# Patient Record
Sex: FEMALE | Race: OTHER | HISPANIC OR LATINO | Employment: OTHER | URBAN - METROPOLITAN AREA
[De-identification: names, ages, dates, MRNs, and addresses within clinical notes are randomized per-mention and may not be internally consistent; named-entity substitution may affect disease eponyms.]

---

## 2020-10-18 ENCOUNTER — HOSPITAL ENCOUNTER (INPATIENT)
Facility: HOSPITAL | Age: 85
LOS: 2 days | Discharge: HOME | DRG: 064 | End: 2020-10-20
Attending: EMERGENCY MEDICINE | Admitting: HOSPITALIST
Payer: MEDICARE

## 2020-10-18 DIAGNOSIS — I10 ESSENTIAL HYPERTENSION: ICD-10-CM

## 2020-10-18 DIAGNOSIS — G93.6 CYTOTOXIC CEREBRAL EDEMA: ICD-10-CM

## 2020-10-18 DIAGNOSIS — I63.312 THROMBOTIC STROKE INVOLVING LEFT MIDDLE CEREBRAL ARTERY: ICD-10-CM

## 2020-10-18 DIAGNOSIS — I63.9 STROKE: ICD-10-CM

## 2020-10-18 DIAGNOSIS — E78.2 MIXED HYPERLIPIDEMIA: ICD-10-CM

## 2020-10-18 PROBLEM — R29.810 FACIAL DROOP: Status: ACTIVE | Noted: 2020-10-18

## 2020-10-18 LAB
ALBUMIN SERPL BCP-MCNC: 4.3 G/DL (ref 3.5–5.2)
ALLENS TEST: NORMAL
ALLENS TEST: NORMAL
ALP SERPL-CCNC: 92 U/L (ref 55–135)
ALT SERPL W/O P-5'-P-CCNC: 13 U/L (ref 10–44)
ANION GAP SERPL CALC-SCNC: 11 MMOL/L (ref 8–16)
APTT BLDCRRT: 35.8 SEC (ref 21–32)
AST SERPL-CCNC: 22 U/L (ref 10–40)
BASOPHILS # BLD AUTO: 0.03 K/UL (ref 0–0.2)
BASOPHILS NFR BLD: 0.4 % (ref 0–1.9)
BILIRUB SERPL-MCNC: 1.2 MG/DL (ref 0.1–1)
BUN SERPL-MCNC: 10 MG/DL (ref 10–30)
CALCIUM SERPL-MCNC: 9.4 MG/DL (ref 8.7–10.5)
CHLORIDE SERPL-SCNC: 101 MMOL/L (ref 95–110)
CHOLEST SERPL-MCNC: 301 MG/DL (ref 120–199)
CHOLEST/HDLC SERPL: 6.5 {RATIO} (ref 2–5)
CO2 SERPL-SCNC: 23 MMOL/L (ref 23–29)
CREAT SERPL-MCNC: 0.7 MG/DL (ref 0.5–1.4)
CREAT SERPL-MCNC: 0.8 MG/DL (ref 0.5–1.4)
DELSYS: NORMAL
DELSYS: NORMAL
DIFFERENTIAL METHOD: ABNORMAL
EOSINOPHIL # BLD AUTO: 0.3 K/UL (ref 0–0.5)
EOSINOPHIL NFR BLD: 3.7 % (ref 0–8)
ERYTHROCYTE [DISTWIDTH] IN BLOOD BY AUTOMATED COUNT: 13.5 % (ref 11.5–14.5)
EST. GFR  (AFRICAN AMERICAN): >60 ML/MIN/1.73 M^2
EST. GFR  (NON AFRICAN AMERICAN): >60 ML/MIN/1.73 M^2
ESTIMATED AVG GLUCOSE: 114 MG/DL (ref 68–131)
GLUCOSE SERPL-MCNC: 108 MG/DL (ref 70–110)
HBA1C MFR BLD HPLC: 5.6 % (ref 4–5.6)
HCT VFR BLD AUTO: 40.9 % (ref 37–48.5)
HDLC SERPL-MCNC: 46 MG/DL (ref 40–75)
HDLC SERPL: 15.3 % (ref 20–50)
HGB BLD-MCNC: 13.4 G/DL (ref 12–16)
IMM GRANULOCYTES # BLD AUTO: 0.03 K/UL (ref 0–0.04)
IMM GRANULOCYTES NFR BLD AUTO: 0.4 % (ref 0–0.5)
INR PPP: 1 (ref 0.8–1.2)
LDLC SERPL CALC-MCNC: 225.2 MG/DL (ref 63–159)
LYMPHOCYTES # BLD AUTO: 2.5 K/UL (ref 1–4.8)
LYMPHOCYTES NFR BLD: 33 % (ref 18–48)
MCH RBC QN AUTO: 29.8 PG (ref 27–31)
MCHC RBC AUTO-ENTMCNC: 32.8 G/DL (ref 32–36)
MCV RBC AUTO: 91 FL (ref 82–98)
MONOCYTES # BLD AUTO: 0.6 K/UL (ref 0.3–1)
MONOCYTES NFR BLD: 7.5 % (ref 4–15)
NEUTROPHILS # BLD AUTO: 4.2 K/UL (ref 1.8–7.7)
NEUTROPHILS NFR BLD: 55 % (ref 38–73)
NONHDLC SERPL-MCNC: 255 MG/DL
NRBC BLD-RTO: 0 /100 WBC
PLATELET # BLD AUTO: 296 K/UL (ref 150–350)
PMV BLD AUTO: 8.4 FL (ref 9.2–12.9)
POC PTINR: 1 (ref 0.9–1.2)
POC PTWBT: 12.2 SEC (ref 9.7–14.3)
POTASSIUM SERPL-SCNC: 4.5 MMOL/L (ref 3.5–5.1)
PROT SERPL-MCNC: 8.2 G/DL (ref 6–8.4)
PROTHROMBIN TIME: 10.9 SEC (ref 9–12.5)
RBC # BLD AUTO: 4.5 M/UL (ref 4–5.4)
SAMPLE: NORMAL
SAMPLE: NORMAL
SARS-COV-2 RDRP RESP QL NAA+PROBE: NEGATIVE
SITE: NORMAL
SITE: NORMAL
SODIUM SERPL-SCNC: 135 MMOL/L (ref 136–145)
TRIGL SERPL-MCNC: 149 MG/DL (ref 30–150)
TROPONIN I SERPL DL<=0.01 NG/ML-MCNC: 0.01 NG/ML (ref 0–0.03)
TSH SERPL DL<=0.005 MIU/L-ACNC: 1.57 UIU/ML (ref 0.4–4)
WBC # BLD AUTO: 7.57 K/UL (ref 3.9–12.7)

## 2020-10-18 PROCEDURE — 85025 COMPLETE CBC W/AUTO DIFF WBC: CPT

## 2020-10-18 PROCEDURE — 80061 LIPID PANEL: CPT

## 2020-10-18 PROCEDURE — 85730 THROMBOPLASTIN TIME PARTIAL: CPT

## 2020-10-18 PROCEDURE — 85610 PROTHROMBIN TIME: CPT

## 2020-10-18 PROCEDURE — 84484 ASSAY OF TROPONIN QUANT: CPT

## 2020-10-18 PROCEDURE — G0425 INPT/ED TELECONSULT30: HCPCS | Mod: GT,,, | Performed by: PSYCHIATRY & NEUROLOGY

## 2020-10-18 PROCEDURE — 83036 HEMOGLOBIN GLYCOSYLATED A1C: CPT

## 2020-10-18 PROCEDURE — 94761 N-INVAS EAR/PLS OXIMETRY MLT: CPT

## 2020-10-18 PROCEDURE — 63600175 PHARM REV CODE 636 W HCPCS: Performed by: HOSPITALIST

## 2020-10-18 PROCEDURE — 99291 CRITICAL CARE FIRST HOUR: CPT | Mod: 25

## 2020-10-18 PROCEDURE — G0378 HOSPITAL OBSERVATION PER HR: HCPCS

## 2020-10-18 PROCEDURE — 93005 ELECTROCARDIOGRAM TRACING: CPT

## 2020-10-18 PROCEDURE — G0425 PR INPT TELEHEALTH CONSULT 30M: ICD-10-PCS | Mod: GT,,, | Performed by: PSYCHIATRY & NEUROLOGY

## 2020-10-18 PROCEDURE — 82565 ASSAY OF CREATININE: CPT

## 2020-10-18 PROCEDURE — 96372 THER/PROPH/DIAG INJ SC/IM: CPT

## 2020-10-18 PROCEDURE — U0002 COVID-19 LAB TEST NON-CDC: HCPCS

## 2020-10-18 PROCEDURE — 11000001 HC ACUTE MED/SURG PRIVATE ROOM

## 2020-10-18 PROCEDURE — 84443 ASSAY THYROID STIM HORMONE: CPT

## 2020-10-18 PROCEDURE — 80053 COMPREHEN METABOLIC PANEL: CPT

## 2020-10-18 RX ORDER — AMLODIPINE BESYLATE 10 MG/1
10 TABLET ORAL DAILY
Status: ON HOLD | COMMUNITY
End: 2020-10-20 | Stop reason: HOSPADM

## 2020-10-18 RX ORDER — LABETALOL HYDROCHLORIDE 5 MG/ML
10 INJECTION, SOLUTION INTRAVENOUS
Status: DISCONTINUED | OUTPATIENT
Start: 2020-10-18 | End: 2020-10-20 | Stop reason: HOSPADM

## 2020-10-18 RX ORDER — METOPROLOL SUCCINATE 100 MG/1
100 TABLET, EXTENDED RELEASE ORAL DAILY
Status: ON HOLD | COMMUNITY
End: 2020-10-20 | Stop reason: HOSPADM

## 2020-10-18 RX ORDER — NAPROXEN SODIUM 220 MG/1
81 TABLET, FILM COATED ORAL DAILY
Status: DISCONTINUED | OUTPATIENT
Start: 2020-10-19 | End: 2020-10-20 | Stop reason: HOSPADM

## 2020-10-18 RX ORDER — ALBUTEROL SULFATE 0.83 MG/ML
2.5 SOLUTION RESPIRATORY (INHALATION) EVERY 6 HOURS PRN
COMMUNITY

## 2020-10-18 RX ORDER — ONDANSETRON 2 MG/ML
4 INJECTION INTRAMUSCULAR; INTRAVENOUS EVERY 12 HOURS PRN
Status: DISCONTINUED | OUTPATIENT
Start: 2020-10-18 | End: 2020-10-20 | Stop reason: HOSPADM

## 2020-10-18 RX ORDER — ATORVASTATIN CALCIUM 40 MG/1
40 TABLET, FILM COATED ORAL DAILY
Status: DISCONTINUED | OUTPATIENT
Start: 2020-10-19 | End: 2020-10-19

## 2020-10-18 RX ORDER — SODIUM CHLORIDE 0.9 % (FLUSH) 0.9 %
10 SYRINGE (ML) INJECTION
Status: DISCONTINUED | OUTPATIENT
Start: 2020-10-18 | End: 2020-10-20 | Stop reason: HOSPADM

## 2020-10-18 RX ORDER — NAPROXEN SODIUM 220 MG/1
81 TABLET, FILM COATED ORAL DAILY
COMMUNITY

## 2020-10-18 RX ORDER — ENOXAPARIN SODIUM 100 MG/ML
40 INJECTION SUBCUTANEOUS EVERY 24 HOURS
Status: DISCONTINUED | OUTPATIENT
Start: 2020-10-18 | End: 2020-10-20 | Stop reason: HOSPADM

## 2020-10-18 RX ORDER — OMEPRAZOLE 20 MG/1
20 CAPSULE, DELAYED RELEASE ORAL DAILY
COMMUNITY

## 2020-10-18 RX ORDER — LOSARTAN POTASSIUM 25 MG/1
25 TABLET ORAL DAILY
Status: ON HOLD | COMMUNITY
End: 2020-10-20 | Stop reason: HOSPADM

## 2020-10-18 RX ADMIN — ENOXAPARIN SODIUM 40 MG: 40 INJECTION SUBCUTANEOUS at 10:10

## 2020-10-18 NOTE — ED PROVIDER NOTES
Encounter Date: 10/18/2020    SCRIBE #1 NOTE: I, Faby Guerin, am scribing for, and in the presence of,  Dr. Rodriguez. I have scribed the following portions of the note - Other sections scribed: HPI, ROS, PE, MDM.       History     Chief Complaint   Patient presents with    Facial Droop     grand daughter reports that at 1430 pt had trouble swallowing, drooling, and left sided facial droop.        Time seen by provider: 17:50 on 10/18/2020      Patient is a 89-year-old  female the past history of hypertension presents ED with complaint of right-sided facial droop that began at approximately 2:30 p.m. today.  Last known normal at 2:30 p.m..  The granddaughter reports right-sided facial droop with dysarthria and difficulty tolerating secretions at maximal onset of approximately 2:30 p.m..  Granddaughter denies any additional symptoms or complaints from the patient such as weakness, numbness, vision change or headache.  Per granddaughter, patient's facial droop has improved slightly on arrival to the emergency department.  The patient denies any current complaints on my questioning.  Patient denies any history of ischemic or hemorrhagic stroke.    Right-sided facial droop appreciated by nursing staff on patient arrival to the emergency department.  Stroke Code immediately called and patient rush rolled to the CT scanner for CT head  without IV contrast.    The history is provided by a relative and the patient.     Review of patient's allergies indicates:  No Known Allergies  Past Medical History:   Diagnosis Date    Hypertension      History reviewed. No pertinent surgical history.  History reviewed. No pertinent family history.  Social History     Tobacco Use    Smoking status: Never Smoker    Smokeless tobacco: Never Used   Substance Use Topics    Alcohol use: Not on file    Drug use: Not on file     Review of Systems   Constitutional: Negative for chills and fever.   HENT: Negative for sore throat.     Eyes: Negative for visual disturbance.   Respiratory: Negative for shortness of breath.    Cardiovascular: Negative for chest pain.   Gastrointestinal: Negative for abdominal pain, nausea and vomiting.   Genitourinary: Negative for difficulty urinating, frequency and urgency.   Musculoskeletal: Negative for back pain.   Skin: Negative for rash and wound.   Neurological: Positive for facial asymmetry (R sided droop). Negative for syncope, speech difficulty, weakness and headaches.   Psychiatric/Behavioral: Negative for agitation, behavioral problems and confusion.       Physical Exam     Initial Vitals   BP Pulse Resp Temp SpO2   10/18/20 1747 10/18/20 1747 10/18/20 1747 10/18/20 1851 10/18/20 1748   133/71 86 (!) 25 98.4 °F (36.9 °C) 95 %      MAP       --                Physical Exam    Nursing note and vitals reviewed.  Constitutional: She appears well-developed and well-nourished. She is not diaphoretic. No distress.   HENT:   Head: Normocephalic and atraumatic.   Right Ear: Tympanic membrane normal.   Left Ear: Tympanic membrane normal.   Mouth/Throat: Oropharynx is clear and moist.   Eyes: Conjunctivae and EOM are normal. Pupils are equal, round, and reactive to light.   Neck: Normal range of motion. Neck supple.   Cardiovascular: Normal rate, regular rhythm and normal heart sounds. Exam reveals no gallop and no friction rub.    No murmur heard.  Pulmonary/Chest: Breath sounds normal. She has no wheezes. She has no rhonchi. She has no rales.   Abdominal: Soft. Bowel sounds are normal. There is no abdominal tenderness. There is no rebound and no guarding.   Musculoskeletal: Normal range of motion. No tenderness or edema.   Lymphadenopathy:     She has no cervical adenopathy.   Neurological: She is alert and oriented to person, place, and time. She has normal strength.   Mild R sided facial droop   No pronator drift  No aphasia  Bilateral upper and bilateral lower strength 5/5  Sensation grossly intact  No  aphasia  No dysarthria     Skin: Skin is warm and dry. Capillary refill takes less than 2 seconds. No rash noted.         ED Course   Critical Care    Date/Time: 10/18/2020 6:53 PM  Performed by: Jose Rodriguez MD  Authorized by: Jose Rodirguez MD   Direct patient critical care time: 10 minutes  Additional history critical care time: 5 minutes  Ordering / reviewing critical care time: 5 minutes  Documentation critical care time: 10 minutes  Consulting other physicians critical care time: 10 minutes  Consult with family critical care time: 10 minutes  Other critical care time: 10 minutes  Total critical care time (exclusive of procedural time) : 60 minutes        Labs Reviewed   CBC W/ AUTO DIFFERENTIAL - Abnormal; Notable for the following components:       Result Value    MPV 8.4 (*)     All other components within normal limits   COMPREHENSIVE METABOLIC PANEL - Abnormal; Notable for the following components:    Sodium 135 (*)     Total Bilirubin 1.2 (*)     All other components within normal limits   LIPID PANEL - Abnormal; Notable for the following components:    Cholesterol 301 (*)     LDL Cholesterol 225.2 (*)     Hdl/Cholesterol Ratio 15.3 (*)     Total Cholesterol/HDL Ratio 6.5 (*)     All other components within normal limits   APTT - Abnormal; Notable for the following components:    aPTT 35.8 (*)     All other components within normal limits   PROTIME-INR   TSH   TROPONIN I   SARS-COV-2 RNA AMPLIFICATION, QUAL   HEMOGLOBIN A1C   HEMOGLOBIN A1C   POCT GLUCOSE, HAND-HELD DEVICE   ISTAT PROCEDURE   ISTAT CREATININE     EKG Readings: (Independently Interpreted)   Sinus rhythm with first degree AV block at 81 bpm. No ST elevations or depressions. No STEMI.        Imaging Results          CT Head Without Contrast (Final result)  Result time 10/18/20 17:52:48    Final result by Cezar Montemayor MD (10/18/20 17:52:48)                 Impression:      Moderate involutional and chronic small vessel  changes not inappropriate for age.    No evidence of acute hemorrhage, mass or infarction.    Consider MRI if clinically warranted.      Electronically signed by: Cezar De Oliveiravestri  Date:    10/18/2020  Time:    17:52             Narrative:    EXAMINATION:  CT HEAD WITHOUT CONTRAST    CLINICAL HISTORY:  Neuro deficit, acute, stroke suspected;    TECHNIQUE:  Low dose axial images were obtained through the head.  Coronal and sagittal reformations were also performed. Contrast was not administered.    COMPARISON:  None.    FINDINGS:  There is moderate involutional change manifested by prominent cortical sulcal markings, basilar cisterns and ventricular system.  Low density throughout the deep and subcortical white matter of both cerebral hemispheres is present.  There is no focal loss of gray-white matter junction differentiation, mass or mass effect.  The ventricular system and basilar cisterns appear appropriate size and configuration.  The calvarium is intact.  The sinuses and mastoids are clear.  The middle ears are unremarkable.  Atherosclerotic calcifications of the carotid siphons are noted.  The orbits and orbital contents appear unremarkable.                                 Medical Decision Making:   History:   Old Medical Records: I decided to obtain old medical records.  Clinical Tests:   Lab Tests: Ordered and Reviewed  Radiological Study: Reviewed and Ordered  Medical Tests: Ordered and Reviewed  ED Management:  - CT head WO negative for acute intracranial abnormality per final radiology read  - labs unremarkable   - no further deficits or decompensation of patient while in emergency department  - discussed need for admission with patient and patient's granddaughter; all questions answered   - will admit to [facility] Medicine service for further evaluation and management; recommend MRI brain   - case discussed with Dr. Nugent ([facility]) who will accept the patient to his service         Additional MDM:     NIH Stroke Scale:   Level of consciousness = 0 - alert  LOC questions = 0 - answers both correctly  LOC commands = 0 - performs both correctly  Best gaze = 0 - normal  Visual = 0 - no visual loss  Motor left arm =  0 - no drift  Motor right arm =  0 - no drift  Motor left leg = 0 - no drift  Motor right leg =  0 - no drift  Limb ataxia = 0 - absent  Sensory = 0 - normal  Best language = 0 - no aphasia  Dysarthria = 0 - normal articulation  Extinction and inattention = 0 - no neglect                ED Course as of Oct 18 1858   Sun Oct 18, 2020   1816 Spoke with teleneurology, Dr. Roberts, who advised admission and MRI. She did not recommend stat MRI due to advanced age and presentation.     [BJ]      ED Course User Index  [BJ] Faby Guerin            Clinical Impression:     ICD-10-CM ICD-9-CM   1. Stroke  I63.9 434.91                          ED Disposition Condition    Observation            I, Jose Rodriguez,  personally performed the services described in this documentation. All medical record entries made by the scribe were at my direction and in my presence.  I have reviewed the chart and agree that the record reflects my personal performance and is accurate and complete. Jose Rodriguez M.D. 6:58 PM10/18/2020               Jose Rodriguez MD  10/18/20 1859

## 2020-10-18 NOTE — HPI
89 y/o female with HTN who presented from home to ER after her daughter noticed difficulty swallowing, drooling, and right facial droop.  She was last known normal around 2:30-3:00.  She has never had these symptoms before.  Symptoms are acute and have not improved.  No associated speech changes or visual deficits.  No obvious weakness or numbness.

## 2020-10-18 NOTE — SUBJECTIVE & OBJECTIVE
Neurologic Chief Complaint: Facial droop, dysphagia    Subjective:     Interval History: Patient is seen for follow-up neurological assessment and treatment recommendations:     No acute issues or events overnight.  Continues with neurological deficits.  MRI with small area of diffusion restriction in deep MCA territory.      HPI, Past Medical, Family, and Social History remains the same as documented in the initial encounter.      Review of Systems   Unable to perform ROS: Age   Constitutional: Negative for chills and fever.   HENT: Positive for drooling and trouble swallowing. Negative for voice change.    Eyes: Negative for pain and visual disturbance.   Respiratory: Negative for cough and shortness of breath.    Cardiovascular: Negative for chest pain and palpitations.   Gastrointestinal: Negative for abdominal pain, nausea and vomiting.   Genitourinary: Negative for difficulty urinating.   Musculoskeletal: Negative for arthralgias.   Skin: Negative for rash.   Allergic/Immunologic: Negative for immunocompromised state.   Neurological: Positive for facial asymmetry and speech difficulty. Negative for dizziness, weakness and headaches.   Psychiatric/Behavioral: Negative for agitation.     Objective:   Vitals:  133/71        Physical Exam  Constitutional:       General: She is not in acute distress.     Appearance: Normal appearance.   HENT:      Head: Normocephalic and atraumatic.   Pulmonary:      Effort: Pulmonary effort is normal. No respiratory distress.   Neurological:      Mental Status: She is alert.   Psychiatric:         Mood and Affect: Mood normal.     Neurological Exam  LOC: alert; able to follow complex commands  Attention Span: Good   Language: No aphasia  Articulation: No dysarthria  Orientation: Person, Place, Time   Visual Fields: Full  EOM (CN III, IV, VI): Full/intact  Pupils (CN II, III): PERRL  Facial Sensation (CN V): Normal  Facial Movement (CN VII): Lower facial weakness on the  Right  Motor: Arm left  Full anitgravity  Leg left  Full antigravity  Arm right  Full antigravity  Leg right  Partial antigravity; drifts down some   Cerebellar Dysmetria noted to right hand; normal finger to nose on left.  Normal heel shin bilaterally  Sensation: decreased sensation right arm    Diagnostic Results     Brain Imaging   10/18/2020 CT head  No early infarct signs.  No hemorrhage. Periventricular hypodensities suggestive of chronic white matter disease  .  10/19/2020 MRI  Small area of diffusion restriction within the left corona radiata.  No hemorrhage.  Periventricular FLAIR changes.    Cardiac Imaging   10/19/2020 TTE  · The left ventricle is normal in size. The estimated ejection fraction is 65%.  · Grade I diastolic dysfunction.  · Mild mitral regurgitation.  · Mild to moderate tricuspid regurgitation.  · Mild aortic regurgitation.  · There is no evidence of intracardiac shunting.  · Normal central venous pressure (3 mmHg).  · The estimated PA systolic pressure is 50 mmHg.

## 2020-10-18 NOTE — CONSULTS
[facility]  Vascular Neurology  Comprehensive Stroke Center  Tele-Consultation Note      Consults    Consulting Provider: CHRISTINA CALDERON  Current Providers  No providers found    Patient Location:  [facility] EMERGENCY DEPARTMENT Emergency Department  Spoke hospital nurse at bedside with patient assisting consultant.     Patient information was obtained from relative(s).         Assessment/Plan:     STROKE DOCUMENTATION     Acute Stroke Times:   Acute Stroke Times   Last Known Normal Date: 10/18/20  Last Known Normal Time: 1430  Symptom Onset Date: 10/18/20  Symptom Onset Time: 1430  Stroke Team Called Date: 10/18/20  Stroke Team Called Time: 1737  Stroke Team Arrival Date: 10/18/20  Stroke Team Arrival Time: 1745  CT Interpretation Time: 1745    NIH Scale:  1a. Level of Consciousness: 0-->Alert, keenly responsive  1b. LOC Questions: 0-->Answers both questions correctly  1c. LOC Commands: 0-->Performs both tasks correctly  2. Best Gaze: 0-->Normal  3. Visual: 0-->No visual loss  4. Facial Palsy: 2-->Partial paralysis (total or near-total paralysis of lower face)  5a. Motor Arm, Left: 0-->No drift, limb holds 90 (or 45) degrees for full 10 secs  5b. Motor Arm, Right: 0-->No drift, limb holds 90 (or 45) degrees for full 10 secs  6a. Motor Leg, Left: 0-->No drift, leg holds 30 degree position for full 5 secs  6b. Motor Leg, Right: 0-->No drift, leg holds 30 degree position for full 5 secs  7. Limb Ataxia: 0-->Absent  8. Sensory: 0-->Normal, no sensory loss  9. Best Language: 0-->No aphasia, normal  10. Dysarthria: 1-->Mild-to-moderate dysarthria, patient slurs at least some words and, at worst, can be understood with some difficulty  11. Extinction and Inattention (formerly Neglect): 0-->No abnormality  Total (NIH Stroke Scale): 3     Modified Nataliya    Carlos A Coma Scale:    ABCD2 Score:    [id number] Score:   HAS -BLED Score:   ICH Score:   Hunt & Cho Classification:       Diagnoses:    Facial droop  89 y/o woman presenting with isolated facial droop, which appears upper motor neuron in localization (symmetric and full activation of upper face).  Will defer tPA as symptoms not disabling.  Recommend admission for MRI brain, stroke workup if applicable.        Blood pressure 133/71, pulse 81, resp. rate 18, SpO2 97 %, not currently breastfeeding.  Alteplase Eligible?: Yes  Alteplase Recommendation: Alteplase not recommended due to Mild Non-Disabling Symptoms  Possible Interventional Revascularization Candidate? No; No significant neurological deficit    Disposition Recommendation: admit to inpatient    Subjective:     History of Present Illness:  89 y/o woman with HTN who presents with facial droop and slurred speech.  Per granddaughter, patient started to have slurred speech and drooling around 3 pm when she tried to drink a glass of water.  No prior history of stroke.    Note - technical difficulties limited full Telestroke assessment (no audio on spoke hospital's end).      Woke up with symptoms?: no    Recent bleeding noted: no  Does the patient take any Blood Thinners? no  Medications: Antiplatelets:  aspirin      Past Medical History: hypertension    Past Surgical History: no relevant surgical history    Family History: no relevant history    Social History: no smoking, no drinking, no drugs    Allergies: No Known Allergies No relevant allergies    Review of Systems   Unable to perform ROS: Age     Objective:   Vitals:  133/71    CT READ: Yes  No hemmorhage. No mass effect. No early infarct signs.     Physical Exam  Constitutional:       General: She is not in acute distress.  HENT:      Head: Normocephalic and atraumatic.   Eyes:      Pupils: Pupils are equal, round, and reactive to light.   Pulmonary:      Effort: Pulmonary effort is normal.   Neurological:      Comments: MS: A&XO3, speech fluent, follows commands, no neglect  CN: PERRL, EOMI, sensation intact, R UMN Facial droop, mild  dysarthria  Motor: no arm or leg drift  Sens: intact to LT  Coord: no ataxia on finger to nose                   Recommended the emergency room physician to have a brief discussion with the patient and/or family if available regarding the risks and benefits of treatment, and to briefly document the occurrence of that discussion in his clinical encounter note.     The attending portion of this evaluation, treatment, and documentation was performed per Sally Roberts MD via audiovisual.    Billing code:  (non-intervention mild to moderate stroke, TIA, some mimics)    · This patient has a critical neurological condition/illness, with some potential for high morbidity and mortality.  · There is a moderate probability for acute neurological change leading to clinical and possibly life-threatening deterioration requiring highest level of physician preparedness for urgent intervention.  · Care was coordinated with other physicians involved in the patient's care.  · Radiologic studies and laboratory data were reviewed and interpreted, and plan of care was re-assessed based on the results.  · Diagnosis, treatment options and prognosis may have been discussed with the patient and/or family members or caregiver.      In your opinion, this was a: Tier 1 Van Negative    Consult End Time: 5:59 PM     Sally Roberts MD  Comprehensive Stroke Center  Vascular Neurology   [facility]

## 2020-10-18 NOTE — ED TRIAGE NOTES
"Pt at baseline walks, usually feed self, yesterday granddaughter, gave pt water and noticed " she chocked a little" then began coughing,  Then went to lay down and then woke up normal, granddaughter states when she went to give food again this afternoon she again chocked on water, then began drooling with facial droop, with slurred speech   "

## 2020-10-19 PROBLEM — R53.81 DEBILITATED: Status: ACTIVE | Noted: 2020-10-19

## 2020-10-19 PROBLEM — E78.5 HYPERLIPEMIA: Status: ACTIVE | Noted: 2020-10-19

## 2020-10-19 PROBLEM — I10 ESSENTIAL HYPERTENSION: Status: ACTIVE | Noted: 2020-10-19

## 2020-10-19 PROBLEM — G93.6 CYTOTOXIC CEREBRAL EDEMA: Status: ACTIVE | Noted: 2020-10-19

## 2020-10-19 PROBLEM — I63.312 THROMBOTIC STROKE INVOLVING LEFT MIDDLE CEREBRAL ARTERY: Status: ACTIVE | Noted: 2020-10-19

## 2020-10-19 LAB
ALBUMIN SERPL BCP-MCNC: 3.6 G/DL (ref 3.5–5.2)
ALP SERPL-CCNC: 75 U/L (ref 55–135)
ALT SERPL W/O P-5'-P-CCNC: 11 U/L (ref 10–44)
ANION GAP SERPL CALC-SCNC: 9 MMOL/L (ref 8–16)
AORTIC ROOT ANNULUS: 2.79 CM
AORTIC VALVE CUSP SEPERATION: 1.52 CM
APTT BLDCRRT: 39.7 SEC (ref 21–32)
AST SERPL-CCNC: 18 U/L (ref 10–40)
AV INDEX (PROSTH): 0.79
AV MEAN GRADIENT: 4 MMHG
AV PEAK GRADIENT: 9 MMHG
AV VALVE AREA: 2.54 CM2
AV VELOCITY RATIO: 0.79
BASOPHILS # BLD AUTO: 0.05 K/UL (ref 0–0.2)
BASOPHILS NFR BLD: 0.8 % (ref 0–1.9)
BILIRUB SERPL-MCNC: 1.1 MG/DL (ref 0.1–1)
BSA FOR ECHO PROCEDURE: 1.53 M2
BUN SERPL-MCNC: 9 MG/DL (ref 10–30)
CALCIUM SERPL-MCNC: 8.7 MG/DL (ref 8.7–10.5)
CHLORIDE SERPL-SCNC: 103 MMOL/L (ref 95–110)
CK MB SERPL-MCNC: 1.4 NG/ML (ref 0.1–6.5)
CK MB SERPL-RTO: 2 % (ref 0–5)
CK SERPL-CCNC: 69 U/L (ref 20–180)
CO2 SERPL-SCNC: 25 MMOL/L (ref 23–29)
CREAT SERPL-MCNC: 0.7 MG/DL (ref 0.5–1.4)
CV ECHO LV RWT: 0.44 CM
DIFFERENTIAL METHOD: ABNORMAL
DOP CALC AO PEAK VEL: 1.46 M/S
DOP CALC AO VTI: 26.57 CM
DOP CALC LVOT AREA: 3.2 CM2
DOP CALC LVOT DIAMETER: 2.02 CM
DOP CALC LVOT PEAK VEL: 1.16 M/S
DOP CALC LVOT STROKE VOLUME: 67.59 CM3
DOP CALCLVOT PEAK VEL VTI: 21.1 CM
E WAVE DECELERATION TIME: 174.23 MSEC
E/A RATIO: 0.39
E/E' RATIO: 4.67 M/S
ECHO LV POSTERIOR WALL: 0.79 CM (ref 0.6–1.1)
EOSINOPHIL # BLD AUTO: 0.3 K/UL (ref 0–0.5)
EOSINOPHIL NFR BLD: 4.7 % (ref 0–8)
ERYTHROCYTE [DISTWIDTH] IN BLOOD BY AUTOMATED COUNT: 13.4 % (ref 11.5–14.5)
EST. GFR  (AFRICAN AMERICAN): >60 ML/MIN/1.73 M^2
EST. GFR  (NON AFRICAN AMERICAN): >60 ML/MIN/1.73 M^2
FRACTIONAL SHORTENING: 22 % (ref 28–44)
GLUCOSE SERPL-MCNC: 100 MG/DL (ref 70–110)
HCT VFR BLD AUTO: 36.2 % (ref 37–48.5)
HGB BLD-MCNC: 12 G/DL (ref 12–16)
IMM GRANULOCYTES # BLD AUTO: 0.01 K/UL (ref 0–0.04)
IMM GRANULOCYTES NFR BLD AUTO: 0.2 % (ref 0–0.5)
INR PPP: 1.1 (ref 0.8–1.2)
INTERVENTRICULAR SEPTUM: 0.98 CM (ref 0.6–1.1)
IVRT: 72.31 MSEC
LA MAJOR: 4.7 CM
LA MINOR: 4.8 CM
LA WIDTH: 2.69 CM
LEFT ATRIUM SIZE: 2.76 CM
LEFT ATRIUM VOLUME INDEX: 19.2 ML/M2
LEFT ATRIUM VOLUME: 29.97 CM3
LEFT INTERNAL DIMENSION IN SYSTOLE: 2.79 CM (ref 2.1–4)
LEFT VENTRICLE DIASTOLIC VOLUME INDEX: 34.05 ML/M2
LEFT VENTRICLE DIASTOLIC VOLUME: 53.03 ML
LEFT VENTRICLE MASS INDEX: 57 G/M2
LEFT VENTRICLE SYSTOLIC VOLUME INDEX: 18.8 ML/M2
LEFT VENTRICLE SYSTOLIC VOLUME: 29.32 ML
LEFT VENTRICULAR INTERNAL DIMENSION IN DIASTOLE: 3.56 CM (ref 3.5–6)
LEFT VENTRICULAR MASS: 89.04 G
LV LATERAL E/E' RATIO: 4.38 M/S
LV SEPTAL E/E' RATIO: 5 M/S
LYMPHOCYTES # BLD AUTO: 2.4 K/UL (ref 1–4.8)
LYMPHOCYTES NFR BLD: 35.5 % (ref 18–48)
MAGNESIUM SERPL-MCNC: 2.3 MG/DL (ref 1.6–2.6)
MCH RBC QN AUTO: 29.9 PG (ref 27–31)
MCHC RBC AUTO-ENTMCNC: 33.1 G/DL (ref 32–36)
MCV RBC AUTO: 90 FL (ref 82–98)
MONOCYTES # BLD AUTO: 0.5 K/UL (ref 0.3–1)
MONOCYTES NFR BLD: 7.4 % (ref 4–15)
MV PEAK A VEL: 0.9 M/S
MV PEAK E VEL: 0.35 M/S
MV STENOSIS PRESSURE HALF TIME: 50.53 MS
MV VALVE AREA P 1/2 METHOD: 4.35 CM2
NEUTROPHILS # BLD AUTO: 3.4 K/UL (ref 1.8–7.7)
NEUTROPHILS NFR BLD: 51.4 % (ref 38–73)
NRBC BLD-RTO: 0 /100 WBC
PHOSPHATE SERPL-MCNC: 3.4 MG/DL (ref 2.7–4.5)
PISA MRMAX VEL: 0.06 M/S
PISA TR MAX VEL: 3.42 M/S
PLATELET # BLD AUTO: 266 K/UL (ref 150–350)
PMV BLD AUTO: 8.2 FL (ref 9.2–12.9)
POTASSIUM SERPL-SCNC: 4.2 MMOL/L (ref 3.5–5.1)
PROT SERPL-MCNC: 6.7 G/DL (ref 6–8.4)
PROTHROMBIN TIME: 11.2 SEC (ref 9–12.5)
PULM VEIN S/D RATIO: 1.67
PV PEAK D VEL: 0.36 M/S
PV PEAK S VEL: 0.6 M/S
PV PEAK VELOCITY: 0.84 CM/S
RA MAJOR: 4.9 CM
RA PRESSURE: 3 MMHG
RA WIDTH: 3.88 CM
RBC # BLD AUTO: 4.02 M/UL (ref 4–5.4)
RIGHT VENTRICULAR END-DIASTOLIC DIMENSION: 2.64 CM
SODIUM SERPL-SCNC: 137 MMOL/L (ref 136–145)
TDI LATERAL: 0.08 M/S
TDI SEPTAL: 0.07 M/S
TDI: 0.08 M/S
TR MAX PG: 47 MMHG
TROPONIN I SERPL DL<=0.01 NG/ML-MCNC: 0.01 NG/ML (ref 0–0.03)
TV REST PULMONARY ARTERY PRESSURE: 50 MMHG
WBC # BLD AUTO: 6.62 K/UL (ref 3.9–12.7)

## 2020-10-19 PROCEDURE — 97165 OT EVAL LOW COMPLEX 30 MIN: CPT

## 2020-10-19 PROCEDURE — 36415 COLL VENOUS BLD VENIPUNCTURE: CPT

## 2020-10-19 PROCEDURE — 25000003 PHARM REV CODE 250: Performed by: NURSE PRACTITIONER

## 2020-10-19 PROCEDURE — 84484 ASSAY OF TROPONIN QUANT: CPT

## 2020-10-19 PROCEDURE — 96374 THER/PROPH/DIAG INJ IV PUSH: CPT

## 2020-10-19 PROCEDURE — 92610 EVALUATE SWALLOWING FUNCTION: CPT

## 2020-10-19 PROCEDURE — 82550 ASSAY OF CK (CPK): CPT

## 2020-10-19 PROCEDURE — 97161 PT EVAL LOW COMPLEX 20 MIN: CPT

## 2020-10-19 PROCEDURE — G0408 PR TELHEALTH INPT CONSULT 35MIN: ICD-10-PCS | Mod: GT,,, | Performed by: NURSE PRACTITIONER

## 2020-10-19 PROCEDURE — 85730 THROMBOPLASTIN TIME PARTIAL: CPT

## 2020-10-19 PROCEDURE — 83735 ASSAY OF MAGNESIUM: CPT

## 2020-10-19 PROCEDURE — 63600175 PHARM REV CODE 636 W HCPCS: Performed by: HOSPITALIST

## 2020-10-19 PROCEDURE — 85610 PROTHROMBIN TIME: CPT

## 2020-10-19 PROCEDURE — 84100 ASSAY OF PHOSPHORUS: CPT

## 2020-10-19 PROCEDURE — 80053 COMPREHEN METABOLIC PANEL: CPT

## 2020-10-19 PROCEDURE — 11000001 HC ACUTE MED/SURG PRIVATE ROOM

## 2020-10-19 PROCEDURE — 94761 N-INVAS EAR/PLS OXIMETRY MLT: CPT

## 2020-10-19 PROCEDURE — G0408 INPT/TELE FOLLOW UP 35: HCPCS | Mod: GT,,, | Performed by: NURSE PRACTITIONER

## 2020-10-19 PROCEDURE — 97802 MEDICAL NUTRITION INDIV IN: CPT

## 2020-10-19 PROCEDURE — 82553 CREATINE MB FRACTION: CPT

## 2020-10-19 PROCEDURE — 85025 COMPLETE CBC W/AUTO DIFF WBC: CPT

## 2020-10-19 PROCEDURE — 25000003 PHARM REV CODE 250: Performed by: HOSPITALIST

## 2020-10-19 PROCEDURE — 96372 THER/PROPH/DIAG INJ SC/IM: CPT | Mod: 59

## 2020-10-19 RX ORDER — PANTOPRAZOLE SODIUM 40 MG/1
40 TABLET, DELAYED RELEASE ORAL DAILY
Status: DISCONTINUED | OUTPATIENT
Start: 2020-10-19 | End: 2020-10-20 | Stop reason: HOSPADM

## 2020-10-19 RX ORDER — ATORVASTATIN CALCIUM 40 MG/1
80 TABLET, FILM COATED ORAL DAILY
Status: DISCONTINUED | OUTPATIENT
Start: 2020-10-20 | End: 2020-10-20 | Stop reason: HOSPADM

## 2020-10-19 RX ORDER — CLOPIDOGREL BISULFATE 75 MG/1
75 TABLET ORAL DAILY
Status: DISCONTINUED | OUTPATIENT
Start: 2020-10-20 | End: 2020-10-20 | Stop reason: HOSPADM

## 2020-10-19 RX ORDER — CLOPIDOGREL BISULFATE 75 MG/1
300 TABLET ORAL ONCE
Status: COMPLETED | OUTPATIENT
Start: 2020-10-19 | End: 2020-10-19

## 2020-10-19 RX ADMIN — LORAZEPAM 1 MG: 2 INJECTION INTRAMUSCULAR; INTRAVENOUS at 01:10

## 2020-10-19 RX ADMIN — CLOPIDOGREL 300 MG: 75 TABLET, FILM COATED ORAL at 06:10

## 2020-10-19 RX ADMIN — ENOXAPARIN SODIUM 40 MG: 40 INJECTION SUBCUTANEOUS at 04:10

## 2020-10-19 RX ADMIN — ATORVASTATIN CALCIUM 40 MG: 40 TABLET, FILM COATED ORAL at 09:10

## 2020-10-19 RX ADMIN — ASPIRIN 81 MG 81 MG: 81 TABLET ORAL at 09:10

## 2020-10-19 RX ADMIN — PANTOPRAZOLE SODIUM 40 MG: 40 TABLET, DELAYED RELEASE ORAL at 09:10

## 2020-10-19 NOTE — SUBJECTIVE & OBJECTIVE
Past Medical History:   Diagnosis Date    Hypertension        History reviewed. No pertinent surgical history.    Review of patient's allergies indicates:  No Known Allergies    No current facility-administered medications on file prior to encounter.      Current Outpatient Medications on File Prior to Encounter   Medication Sig    albuterol (PROVENTIL) 2.5 mg /3 mL (0.083 %) nebulizer solution Take 2.5 mg by nebulization every 6 (six) hours as needed for Wheezing. Rescue    amLODIPine (NORVASC) 10 MG tablet Take 10 mg by mouth once daily.    aspirin 81 MG Chew Take 81 mg by mouth once daily.    losartan (COZAAR) 25 MG tablet Take 25 mg by mouth once daily.    metoprolol succinate (TOPROL-XL) 100 MG 24 hr tablet Take 100 mg by mouth once daily.    omeprazole (PRILOSEC) 20 MG capsule Take 20 mg by mouth once daily.     Family History     None        Tobacco Use    Smoking status: Never Smoker    Smokeless tobacco: Never Used   Substance and Sexual Activity    Alcohol use: Not on file    Drug use: Not on file    Sexual activity: Not on file     Review of Systems   HENT: Positive for hearing loss.      Objective:     Vital Signs (Most Recent):  Temp: 96.2 °F (35.7 °C) (10/19/20 0754)  Pulse: 81 (10/19/20 0754)  Resp: 17 (10/19/20 0754)  BP: (!) 143/69 (10/19/20 0754)  SpO2: 95 % (10/19/20 0754) Vital Signs (24h Range):  Temp:  [96.2 °F (35.7 °C)-98.4 °F (36.9 °C)] 96.2 °F (35.7 °C)  Pulse:  [62-89] 81  Resp:  [16-37] 17  SpO2:  [94 %-98 %] 95 %  BP: (110-143)/(59-75) 143/69     Weight: 50.6 kg (111 lb 8.8 oz)  Body mass index is 18.01 kg/m².    Physical Exam        Significant Labs: {Results:48908}    Significant Imaging: {Imaging Review:76299}

## 2020-10-19 NOTE — ASSESSMENT & PLAN NOTE
-140  Taking amlodipine 10 mg daily, metoprolol 100 mg daily and losartan 25 mg daily  Avoid aggressive BP management for now   Allow permissive HTN

## 2020-10-19 NOTE — CONSULTS
"  [facility]  Adult Nutrition  Consult Note    SUMMARY     Recommendations    Recommendation:   1. Add low Na restrictions to diet.   2. Encourage intake at meals as tolerated.   3. Monitor need for supplements.   4. RD to follow to monitor po intake    Goals:   Pt will tolerate diet with at least 50% intake at meals by RD follow up  Nutrition Goal Status: new  Communication of RD Recs: reviewed with RN(Carleen)    Reason for Assessment  Reason For Assessment: consult(stroke pathway)  Diagnosis: stroke/CVA  Relevant Medical History: HTN  General Information Comments: Pt started on Mech Soft diet per SLP recs. Pt was off unit to MRI at visit-unable to assess NFPE.  Nutrition Discharge Planning: d/c diet per SLP recs    Nutrition Risk Screen  Nutrition Risk Screen: no indicators present    Nutrition/Diet History  Food Preferences: no Faith or cultural food prefs identified  Spiritual, Cultural Beliefs, [employer] Practices, Values that Affect Care: no  Factors Affecting Nutritional Intake: difficulty/impaired swallowing    Anthropometrics  Temp: 96.7 °F (35.9 °C)  Height Method: Stated  Height: 5' 6" (167.6 cm)  Height (inches): 66 in  Weight Method: Bed Scale  Weight: 50.3 kg (110 lb 14.3 oz)  Weight (lb): 110.89 lb  Ideal Body Weight (IBW), Female: 130 lb  % Ideal Body Weight, Female (lb): 85.3 %  BMI (Calculated): 17.9  BMI Grade: 17 - 18.4 protein-energy malnutrition grade I     Lab/Procedures/Meds  Pertinent Labs Reviewed: reviewed  Pertinent Labs Comments: BUN 9L  Pertinent Medications Reviewed: reviewed  Pertinent Medications Comments: aspirin, pantoprazole    Estimated/Assessed Needs  Weight Used For Calorie Calculations: 50.3 kg (110 lb 14.3 oz)  Energy Calorie Requirements (kcal): 1509 (30 kcal/kg)  Energy Need Method: Kcal/kg  Protein Requirements: 60g (1.2g/kg)  Weight Used For Protein Calculations: 50.3 kg (110 lb 14.3 oz)  Estimated Fluid Requirement Method: RDA Method  RDA Method " (mL): 1509     Nutrition Prescription Ordered  Current Diet Order: [facility] Soft    Evaluation of Received Nutrient/Fluid Intake  I/O: N/A  Energy Calories Required: not meeting needs  Protein Required: not meeting needs  Fluid Required: not meeting needs  Comments: LBM 10/18  % Intake of Estimated Energy Needs: Other: diet just started  % Meal Intake: Other: diet just started    Nutrition Risk  Level of Risk/Frequency of Follow-up: (2xweekly)     Assessment and Plan  * Stroke  Contributing Nutrition Diagnosis  Difficulty swallowing    Related to (etiology):   stroke    Signs and Symptoms (as evidenced by):   [facility] Soft diet    Interventions:  Collaboration with other providers    Nutrition Diagnosis Status:   New         Monitor and Evaluation  Food and Nutrient Intake: food and beverage intake  Food and Nutrient Adminstration: diet order  Physical Activity and Function: nutrition-related ADLs and IADLs  Anthropometric Measurements: weight  Biochemical Data, Medical Tests and Procedures: electrolyte and renal panel  Nutrition-Focused Physical Findings: overall appearance     Malnutrition Assessment  Unable to assess NFPE-pt off unit at visit       Nutrition Follow-Up    RD Follow-up?: Yes

## 2020-10-19 NOTE — ASSESSMENT & PLAN NOTE
-Stroke Risk Factor  -SBP < 220  -Can slowly resume home BP meds in 24-48 hours if no significant carotid stenosis present on US

## 2020-10-19 NOTE — SUBJECTIVE & OBJECTIVE
Past Medical History:   Diagnosis Date    Hypertension        History reviewed. No pertinent surgical history.    Review of patient's allergies indicates:  No Known Allergies    No current facility-administered medications on file prior to encounter.      Current Outpatient Medications on File Prior to Encounter   Medication Sig    albuterol (PROVENTIL) 2.5 mg /3 mL (0.083 %) nebulizer solution Take 2.5 mg by nebulization every 6 (six) hours as needed for Wheezing. Rescue    amLODIPine (NORVASC) 10 MG tablet Take 10 mg by mouth once daily.    aspirin 81 MG Chew Take 81 mg by mouth once daily.    losartan (COZAAR) 25 MG tablet Take 25 mg by mouth once daily.    metoprolol succinate (TOPROL-XL) 100 MG 24 hr tablet Take 100 mg by mouth once daily.    omeprazole (PRILOSEC) 20 MG capsule Take 20 mg by mouth once daily.     Family History     None        Tobacco Use    Smoking status: Never Smoker    Smokeless tobacco: Never Used   Substance and Sexual Activity    Alcohol use: Not on file    Drug use: Not on file    Sexual activity: Not on file     Review of Systems   Constitutional: Negative for chills, diaphoresis and fever.   Eyes: Negative for photophobia.   Respiratory: Negative for cough, chest tightness, shortness of breath and wheezing.    Cardiovascular: Negative for chest pain, palpitations and leg swelling.   Gastrointestinal: Negative for abdominal pain, diarrhea, nausea and vomiting.   Genitourinary: Negative for dysuria, flank pain, frequency and hematuria.   Musculoskeletal: Negative for back pain and myalgias.   Neurological: Positive for facial asymmetry. Negative for dizziness, syncope, light-headedness and headaches.   Psychiatric/Behavioral: Negative for confusion.     Objective:     Vital Signs (Most Recent):  Temp: 97.9 °F (36.6 °C) (10/19/20 0528)  Pulse: 62 (10/19/20 0714)  Resp: 16 (10/19/20 0528)  BP: (!) 110/59 (10/19/20 0528)  SpO2: 96 % (10/19/20 0528) Vital Signs (24h  Range):  Temp:  [97.1 °F (36.2 °C)-98.4 °F (36.9 °C)] 97.9 °F (36.6 °C)  Pulse:  [62-89] 62  Resp:  [16-37] 16  SpO2:  [94 %-98 %] 96 %  BP: (110-141)/(59-75) 110/59     Weight: 50.6 kg (111 lb 8.8 oz)  Body mass index is 18.01 kg/m².    Physical Exam  Constitutional:       Appearance: She is well-developed.   HENT:      Head: Normocephalic and atraumatic.   Eyes:      Conjunctiva/sclera: Conjunctivae normal.      Pupils: Pupils are equal, round, and reactive to light.   Neck:      Musculoskeletal: Normal range of motion.      Vascular: No JVD.   Cardiovascular:      Rate and Rhythm: Normal rate and regular rhythm.      Heart sounds: Normal heart sounds.   Pulmonary:      Effort: Pulmonary effort is normal. No respiratory distress.      Breath sounds: No wheezing.   Abdominal:      General: Bowel sounds are normal. There is no distension.      Palpations: Abdomen is soft.      Tenderness: There is no abdominal tenderness. There is no guarding.   Musculoskeletal: Normal range of motion.         General: No tenderness.   Skin:     General: Skin is warm and dry.      Capillary Refill: Capillary refill takes less than 2 seconds.   Neurological:      General: No focal deficit present.      Mental Status: She is alert and oriented to person, place, and time.      Comments: Mild R facial droop  Upper/Lower extremity strength 5/5    Psychiatric:         Behavior: Behavior normal.           CRANIAL NERVES     CN III, IV, VI   Pupils are equal, round, and reactive to light.       Significant Labs:   BMP:   Recent Labs   Lab 10/19/20  0322         K 4.2      CO2 25   BUN 9*   CREATININE 0.7   CALCIUM 8.7   MG 2.3     CBC:   Recent Labs   Lab 10/18/20  1800 10/19/20  0322   WBC 7.57 6.62   HGB 13.4 12.0   HCT 40.9 36.2*    266       Significant Imaging: I have reviewed all pertinent imaging results/findings within the past 24 hours.

## 2020-10-19 NOTE — PROGRESS NOTES
[facility]  Progress Note    Patient Name: Jessica Parkinson  MRN: [id number]  Patient Class: IP- Inpatient   Admission Date: 10/18/2020  Length of Stay: 0 days  Attending Physician: Navneet Nugent, *  Primary Care Provider: Primary Doctor No        Subjective:     Principal Problem:Thrombotic stroke involving left middle cerebral artery        HPI:  Ms. Parkinson is a 89 yo  female with pmh of hypertension who presented to ED with complaint of right-sided facial droop that began at approximately 2:30 p.m. today.  Last known normal at 2:30 p.m. History collected from granddaughter who reports right-sided facial droop with dysarthria and difficulty tolerating secretions. No additional symptoms reported including weakness, numbness, vision change or headache.  Per granddaughter, patient's facial droop has improved slightly on arrival to the emergency department. No complaints on my exam.     CTH shows moderate involutional and chronic small vessel changes not inappropriate for age. No evidence of acute hemorrhage, mass or infarction. Labs unremarkable.  VSS. Patient evaluated by vascular neurology with recommendation for CVA workup. Pt admitted to [facility].     Overview/Hospital Course:  No notes on file    Interval History: Seen with virtual . Denies any acute complaints, but appeared to be confused (stated that she was visiting, did not know was in a hospital). Did follow some basic commands but unable to get good history. Called granddaughter and gave updates, who felt she had improved closer to baseline when seen earlier today. MRI attempted this am and required sedation; gave ativan and obtained images which does show MCA stroke. Vascular Neuro following. Awaiting carotid US. Not candidate for intervention.    Review of Systems   Unable to perform ROS: Mental status change     Objective:     Vital Signs (Most Recent):  Temp: 96.7 °F (35.9  °C) (10/19/20 1237)  Pulse: 84 (10/19/20 1237)  Resp: 19 (10/19/20 1237)  BP: (!) 145/86 (10/19/20 1237)  SpO2: 96 % (10/19/20 1237) Vital Signs (24h Range):  Temp:  [96.2 °F (35.7 °C)-98.4 °F (36.9 °C)] 96.7 °F (35.9 °C)  Pulse:  [62-89] 84  Resp:  [16-37] 19  SpO2:  [93 %-98 %] 96 %  BP: (110-145)/(59-86) 145/86     Weight: 50.3 kg (110 lb 14.3 oz)  Body mass index is 17.9 kg/m².  No intake or output data in the 24 hours ending 10/19/20 1633   Physical Exam  Constitutional:       Appearance: She is well-developed.   HENT:      Head: Normocephalic and atraumatic.   Eyes:      Conjunctiva/sclera: Conjunctivae normal.      Pupils: Pupils are equal, round, and reactive to light.   Neck:      Musculoskeletal: Normal range of motion.      Vascular: No JVD.   Cardiovascular:      Rate and Rhythm: Normal rate and regular rhythm.      Heart sounds: Normal heart sounds.   Pulmonary:      Effort: Pulmonary effort is normal. No respiratory distress.      Breath sounds: No wheezing.   Abdominal:      General: Bowel sounds are normal. There is no distension.      Palpations: Abdomen is soft.      Tenderness: There is no abdominal tenderness. There is no guarding.   Musculoskeletal: Normal range of motion.         General: No tenderness.   Skin:     General: Skin is warm and dry.      Capillary Refill: Capillary refill takes less than 2 seconds.   Neurological:      General: No focal deficit present.      Mental Status: She is alert and oriented to person, place, and time.      Comments: Mild R facial droop  Upper/Lower extremity strength 5/5    Psychiatric:         Behavior: Behavior normal.         Significant Labs: All pertinent labs within the past 24 hours have been reviewed.    Significant Imaging: I have reviewed all pertinent imaging results/findings within the past 24 hours.      Assessment/Plan:      * Thrombotic stroke involving left middle cerebral artery  89 y/o woman with HTN who presents with right facial droop,  slurred speech and difficulty tolerating secretion. Onset 1500.    -CTH shows moderate involutional and chronic small vessel changes not inappropriate for age. No evidence of acute hemorrhage, mass or infarction.   - mild rt sided facial droop noted on exam, no additional focal deficits noted although appears somewhat confused (possibly difficulty with communication, although PAYAL  used)  -MRI brain with deep left MCA stroke  -carotid US pending  -TTE w/o PFO, normal EF  -initiated on ASA 81, plavix 300->75mg daily; will give DAPT x21 days then ASA alone  -initiated on atorvastatin 80  -Vascular neurology following  -A1C 5.6  -, HDL 15   -SLP eval and treat   -PT/OT eval and treat     Debilitated  - will discharge with       Hyperlipemia  - initiate atorvastatin 80      Cytotoxic cerebral edema  - see above      Essential hypertension  -140  Taking amlodipine 10 mg daily, metoprolol 100 mg daily and losartan 25 mg daily  S/p 24hr permissive HTN  - can add on slowly if becomes HTN      Stroke  - see above        VTE Risk Mitigation (From admission, onward)         Ordered     enoxaparin injection 40 mg  Every 24 hours      10/18/20 1848     IP VTE HIGH RISK PATIENT  Once      10/18/20 1848     Place sequential compression device  Until discontinued      10/18/20 1848                Discharge Planning   JONAS:      Code Status: Full Code   Is the patient medically ready for discharge?:     Reason for patient still in hospital (select all that apply): Imaging and Consult recommendations  Discharge Plan A: (TBD)                  Navneet Nugent MD  Department of Hospital Medicine   [facility]

## 2020-10-19 NOTE — ED NOTES
Daily Artyue (granddaughter) [phone]    Request updates in plan of care and possible procedures.

## 2020-10-19 NOTE — PLAN OF CARE
Pt seen this date for swallow eval, recommend mech soft and thin liquids, will f/u to ensure safety with meal.

## 2020-10-19 NOTE — PT/OT/SLP PROGRESS
Occupational Therapy  Visit Attempt x2    Patient Name:  Jessica Parkinson   MRN:  [id number]    Patient not seen today secondary to CT/MRI. Will follow-up as available.    Seema Morrison OT  10/19/2020

## 2020-10-19 NOTE — PROGRESS NOTES
[facility]  Vascular Neurology  Comprehensive Stroke Center  Telestroke Progress Note    Assessment/Plan:     * Thrombotic stroke involving left middle cerebral artery  89 y/o with HTN presented with right sided faical droop, drooling, and dysphagia.  MRI with deep left MCA infarct. TTE with no wall motion abnormality, thrombus, or shunting.  No left atrial enlargement.  Etiology most likely small vessel disease.  Could potentially be watershed zone.  Carotid US pending.      Antithrombotics for secondary stroke prevention: Antiplatelets: Aspirin: 81 mg daily  Clopidogrel: 300 mg loading dose x 1, now  Clopidogrel: 75 mg daily DAPT x 21 days then monotherapy with ASA only.    Statins for secondary stroke prevention and hyperlipidemia, if present:   Statins: Atorvastatin- 80 mg daily - .2    Aggressive risk factor modification: HTN, HLD     Rehab efforts: The patient has been evaluated by a stroke team provider and the therapy needs have been fully considered based off the presenting complaints and exam findings. The following therapy evaluations are needed: PT evaluate and treat, OT evaluate and treat, SLP evaluate and treat    Diagnostics ordered/pending: Carotid ultrasound to assess vasculature    VTE prophylaxis: Enoxaparin 40 mg SQ every 24 hours    BP parameters: Infarct: No intervention, SBP <220     -Stroke team to follow carotid US  -Dispo per therapy recommendations  -Ambulatory referral Vascular Neurology in 4-6 weeks        Cytotoxic cerebral edema  Area of cytotoxic cerebral edema identified when reviewing brain imaging in the territory of the left middle cerebral artery.  There is no mass effect associated with it.  We will continue to monitor the patients clinical exam for any worsening of symptoms which may indicate expansion of the stroke or the area of the edema resulting in the clinical change.  The pattern is suggestive of small vessel or watershed etiology.  -Neuro exams every  4 hours    Essential hypertension  -Stroke Risk Factor  -SBP < 220  -Can slowly resume home BP meds in 24-48 hours if no significant carotid stenosis present on US      Facial droop  -result of stroke  -PT/OT       No notes on file    STROKE DOCUMENTATION   Acute Stroke Times   Last Known Normal Date: 10/18/20  Last Known Normal Time: 1430  Symptom Onset Date: 10/18/20  Symptom Onset Time: 1430  Stroke Team Called Date: 10/18/20  Stroke Team Called Time: 1737  Stroke Team Arrival Date: 10/18/20  Stroke Team Arrival Time: 1745  CT Interpretation Time: 1745    NIH Scale:  1a. Level of Consciousness: 0-->Alert, keenly responsive  1b. LOC Questions: 0-->Answers both questions correctly  1c. LOC Commands: 0-->Performs both tasks correctly  2. Best Gaze: 0-->Normal  3. Visual: 0-->No visual loss  4. Facial Palsy: 1-->Minor paralysis (flattened nasolabial fold, asymmetry on smiling)  5a. Motor Arm, Left: 0-->No drift, limb holds 90 (or 45) degrees for full 10 secs  5b. Motor Arm, Right: 0-->No drift, limb holds 90 (or 45) degrees for full 10 secs  6a. Motor Leg, Left: 0-->No drift, leg holds 30 degree position for full 5 secs  6b. Motor Leg, Right: 1-->Drift, leg falls by the end of the 5-sec period but does not hit bed  7. Limb Ataxia: 1-->Present in one limb  8. Sensory: 1-->Mild-to-moderate sensory loss, patient feels pinprick is less sharp or is dull on the affected side, or there is a loss of superficial pain with pinprick, but patient is aware of being touched  9. Best Language: 0-->No aphasia, normal  10. Dysarthria: 0-->Normal  11. Extinction and Inattention (formerly Neglect): 0-->No abnormality  Total (NIH Stroke Scale): 4       Modified Nataliya Score: 0  [place] Coma Scale:    ABCD2 Score:    [id number] Score:   HAS -BLED Score:   ICH Score:   Hunt & Cho Classification:      Hemorrhagic change of an Ischemic Stroke: Does this patient have an ischemic stroke with hemorrhagic changes? No     Neurologic Chief  Complaint: Facial droop, dysphagia    Subjective:     Interval History: Patient is seen for follow-up neurological assessment and treatment recommendations:     No acute issues or events overnight.  Continues with neurological deficits.  MRI with small area of diffusion restriction in deep MCA territory.      HPI, Past Medical, Family, and Social History remains the same as documented in the initial encounter.      Review of Systems   Unable to perform ROS: Age   Constitutional: Negative for chills and fever.   HENT: Positive for drooling and trouble swallowing. Negative for voice change.    Eyes: Negative for pain and visual disturbance.   Respiratory: Negative for cough and shortness of breath.    Cardiovascular: Negative for chest pain and palpitations.   Gastrointestinal: Negative for abdominal pain, nausea and vomiting.   Genitourinary: Negative for difficulty urinating.   Musculoskeletal: Negative for arthralgias.   Skin: Negative for rash.   Allergic/Immunologic: Negative for immunocompromised state.   Neurological: Positive for facial asymmetry and speech difficulty. Negative for dizziness, weakness and headaches.   Psychiatric/Behavioral: Negative for agitation.     Objective:   Vitals:  133/71        Physical Exam  Constitutional:       General: She is not in acute distress.     Appearance: Normal appearance.   HENT:      Head: Normocephalic and atraumatic.   Pulmonary:      Effort: Pulmonary effort is normal. No respiratory distress.   Neurological:      Mental Status: She is alert.   Psychiatric:         Mood and Affect: Mood normal.     Neurological Exam  LOC: alert; able to follow complex commands  Attention Span: Good   Language: No aphasia  Articulation: No dysarthria  Orientation: Person, Place, Time   Visual Fields: Full  EOM (CN III, IV, VI): Full/intact  Pupils (CN II, III): PERRL  Facial Sensation (CN V): Normal  Facial Movement (CN VII): Lower facial weakness on the Right  Motor: Arm left  Full  anitgravity  Leg left  Full antigravity  Arm right  Full antigravity  Leg right  Partial antigravity; drifts down some   Cerebellar Dysmetria noted to right hand; normal finger to nose on left.  Normal heel shin bilaterally  Sensation: decreased sensation right arm    Diagnostic Results     Brain Imaging   10/18/2020 CT head  No early infarct signs.  No hemorrhage. Periventricular hypodensities suggestive of chronic white matter disease  .  10/19/2020 MRI  Small area of diffusion restriction within the left corona radiata.  No hemorrhage.  Periventricular FLAIR changes.    Cardiac Imaging   10/19/2020 TTE  · The left ventricle is normal in size. The estimated ejection fraction is 65%.  · Grade I diastolic dysfunction.  · Mild mitral regurgitation.  · Mild to moderate tricuspid regurgitation.  · Mild aortic regurgitation.  · There is no evidence of intracardiac shunting.  · Normal central venous pressure (3 mmHg).  · The estimated PA systolic pressure is 50 mmHg.                  IP Unit  Spoke hospital nurse at bedside with patient assisting consultant.     Patient information was obtained from patient and relative(s).     The attending portion of this evaluation, treatment, and documentation was performed per Elsie Childers NP via audiovisual.    Billing code:  ([facility] INPT CONSULT 35MIN)    Consult End Time:  13:30      Elsie Childers NP  [facility]  Department of Vascular Neurology   [facility]  [phone]

## 2020-10-19 NOTE — PLAN OF CARE
Problem: Occupational Therapy Goal  Goal: Occupational Therapy Goal  Outcome: Adequate for Care Transition     Pt appears to be performing at baseline for ADLs and functional mobility at this time. Granddghtr present in room reports significant improvement in symptoms from yesterday's date. Does report that she feels pt demonstrating confusion and agitation at times. Pt owns recommended DME and has 24/7 support at home from her granddghtr. Will recommend HHOT/PT at d/c if desired by pt once returning to [place].

## 2020-10-19 NOTE — ASSESSMENT & PLAN NOTE
89 y/o with HTN presented with right sided faical droop, drooling, and dysphagia.  MRI with deep left MCA infarct. TTE with no wall motion abnormality, thrombus, or shunting.  No left atrial enlargement.  Etiology most likely small vessel disease.  Could potentially be watershed zone.  Carotid US pending.      Antithrombotics for secondary stroke prevention: Antiplatelets: Aspirin: 81 mg daily  Clopidogrel: 300 mg loading dose x 1, now  Clopidogrel: 75 mg daily DAPT x 21 days then monotherapy with ASA only.    Statins for secondary stroke prevention and hyperlipidemia, if present:   Statins: Atorvastatin- 80 mg daily - .2    Aggressive risk factor modification: HTN, HLD     Rehab efforts: The patient has been evaluated by a stroke team provider and the therapy needs have been fully considered based off the presenting complaints and exam findings. The following therapy evaluations are needed: PT evaluate and treat, OT evaluate and treat, SLP evaluate and treat    Diagnostics ordered/pending: Carotid ultrasound to assess vasculature    VTE prophylaxis: Enoxaparin 40 mg SQ every 24 hours    BP parameters: Infarct: No intervention, SBP <220     -Stroke team to follow carotid US  -Dispo per therapy recommendations  -Ambulatory referral Vascular Neurology in 4-6 weeks

## 2020-10-19 NOTE — PLAN OF CARE
Problem: Physical Therapy Goal  Goal: Physical Therapy Goal  Description: Goals to be met by: 2020     Patient will increase functional independence with mobility by performin. Bed to chair transfer with Stand-by Assistance/Contact Guard Assistance  2. Gait  x 75 feet with Stand-by Assistance and Contact Guard Assistance   3. Lower extremity exercise program x10 reps with supervision      Outcome: Ongoing, Progress   Limited Eval performed with OT 2/2 pt being transported to [facility] for a second time and receiving Ativan; pt's granddtr present and reports that she sees an improvement in her symptoms except for some confuesion and agitation at times; pt demonstrating SBA/CGA with amb in room which is about at her baseline; pt owns recommended DME and has 24/7 support at home from her granddtr; will recommend HH PT/OT at d/c if pt wishes by pt once pt returns to [place] will keep pt on caseload since eval was limited.

## 2020-10-19 NOTE — ASSESSMENT & PLAN NOTE
-140  Taking amlodipine 10 mg daily, metoprolol 100 mg daily and losartan 25 mg daily  S/p 24hr permissive HTN  - can add on slowly if becomes HTN

## 2020-10-19 NOTE — ASSESSMENT & PLAN NOTE
Contributing Nutrition Diagnosis  Difficulty swallowing    Related to (etiology):   stroke    Signs and Symptoms (as evidenced by):   [facility] Soft diet    Interventions:  Collaboration with other providers    Nutrition Diagnosis Status:   New

## 2020-10-19 NOTE — PT/OT/SLP EVAL
Physical Therapy Evaluation    Patient Name:  Jessica Parkinson   MRN:  [id number]    Recommendations:     Discharge Recommendations:  home, home health PT, home health OT   Discharge Equipment Recommendations: none   Barriers to discharge: None    Assessment:     Jessica Parkinson is a 89 y.o. female admitted with a medical diagnosis of Thrombotic stroke involving left middle cerebral artery.  She presents with the following impairments/functional limitations:  impaired functional mobilty, impaired balance, gait instability, impaired cognition Limited Eval performed with OT 2/2 pt being transported to MRI for a second time and receiving Ativan; pt's granddtr present and reports that she sees an improvement in her symptoms except for some confuesion and agitation at times; pt demonstrating SBA/CGA with amb in room which is about at her baseline; pt owns recommended DME and has 24/7 support at home from her granddtr; will recommend HH PT/OT at d/c if pt wishes by pt once pt returns to [place] will keep pt on caseload since eval was limited..    Rehab Prognosis: Good; patient would benefit from acute skilled PT services to address these deficits and reach maximum level of function.    Recent Surgery: * No surgery found *      Plan:     During this hospitalization, patient to be seen   to address the identified rehab impairments via gait training, therapeutic activities, therapeutic exercises, neuromuscular re-education and progress toward the following goals:    · Plan of Care Expires:  11/19/20    Subjective     Chief Complaint: no complaints; pt in good spirits  Patient/Family Comments/goals: none stated; granddtr reports increased improvement in symptoms except for mild confusion and agitation at times.  Pain/Comfort:  · Pain Rating 1: 0/10  · Pain Rating Post-Intervention 1: 0/10    Patients cultural, spiritual, Moravian conflicts given the current situation: no    Living Environment:  Pt lives with granddghtr in  [facility] apartment on the 14th floor, elevator access, no steps to enter building, and t/s combo; pt and granddghtr here visiting family   Previous level of function: grandhtr provides 24/7 supervision/assist PRN; CGA/SBA for ambulation without AD; mod I toileting; assist dsg PRN; assist bathing   Equipment Used at Home:  shower chair(reports access to RW and SPC as well but pt does not use)  Assistance upon Discharge: from [facility]/7      Objective:     Communicated with nurse prior to session.  Patient found HOB elevated with peripheral IV  upon PT entry to room.    General Precautions: Standard, fall   Orthopedic Precautions:N/A   Braces: N/A     Exams:  · Cognitive Exam:  Granddtr reports that the pt is oriented; follows multistep commands  · Postural Exam:  Patient presented with the following abnormalities:    · -       Rounded shoulders  · Sensation: negative numbness/tingling  · RLE ROM: WFL  · RLE Strength: 4 to 4+ grossly  · LLE ROM: WFL  · LLE Strength: 4 to 4+grossly    Functional Mobility:  · Bed Mobility:     · Scooting: modified independence  · Supine to Sit: modified independence  · Sit to Supine: modified independence  · Transfers:     · Sit to Stand:  stand by assistance with no AD  · Gait: Pt amb short distance in room ~20ft SBA/CGA with HH A of 2 for safety.  · Balance:  Sit~fair+, stand~fair; amb~Fair     Therapeutic Activities and Exercises:   Limited eval at this time 2/2 transport present to take pt to MRI; nsg present to administer ativan; pt stood x 3 trials with SBA from EOB; SBA/CGA with HHA; while pt amb, pt in good spirits and performed high march steps; pt returned to bed to be taken to MRI.    AM-PAC 6 CLICK MOBILITY  Total Score:20     Patient left HOB elevated with all lines intact, call button in reach, bed alarm on, nurse notified and granddtr present.    GOALS:   Multidisciplinary Problems     Physical Therapy Goals        Problem: Physical Therapy Goal    Goal Priority  Disciplines Outcome Goal Variances Interventions   Physical Therapy Goal     PT, PT/OT Ongoing, Progressing     Description: Goals to be met by: 2020     Patient will increase functional independence with mobility by performin. Bed to chair transfer with Stand-by Assistance/Contact Guard Assistance  2. Gait  x 75 feet with Stand-by Assistance and Contact Guard Assistance   3. Lower extremity exercise program x10 reps with supervision                       History:     Past Medical History:   Diagnosis Date    Hypertension        History reviewed. No pertinent surgical history.    Time Tracking:     PT Received On: 10/19/20  PT Start Time: 1300     PT Stop Time: 1316  PT Total Time (min): 16 min with OT    Billable Minutes: Evaluation 16 minutes      [place] Simon, PT  10/19/2020

## 2020-10-19 NOTE — HPI
Ms. Parkinson is a 89 yo  female with pmh of hypertension who presented to ED with complaint of right-sided facial droop that began at approximately 2:30 p.m. today.  Last known normal at 2:30 p.m. History collected from granddaughter who reports right-sided facial droop with dysarthria and difficulty tolerating secretions. No additional symptoms reported including weakness, numbness, vision change or headache.  Per granddaughter, patient's facial droop has improved slightly on arrival to the emergency department. No complaints on my exam.     CTH shows moderate involutional and chronic small vessel changes not inappropriate for age. No evidence of acute hemorrhage, mass or infarction. Labs unremarkable.  VSS. Patient evaluated by vascular neurology with recommendation for CVA workup. Pt admitted to [facility].

## 2020-10-19 NOTE — PLAN OF CARE
Recommendation:   1. Add low Na restrictions to diet.   2. Encourage intake at meals as tolerated.   3. Monitor need for supplements.   4. RD to follow to monitor po intake    Goals:   Pt will tolerate diet with at least 50% intake at meals by RD follow up  Nutrition Goal Status: new  Communication of RD Recs: reviewed with RN(Carleen)

## 2020-10-19 NOTE — SUBJECTIVE & OBJECTIVE
Interval History: Seen with virtual . Denies any acute complaints, but appeared to be confused (stated that she was visiting, did not know was in a hospital). Did follow some basic commands but unable to get good history. Called granddaughter and gave updates, who felt she had improved closer to baseline when seen earlier today. MRI attempted this am and required sedation; gave ativan and obtained images which does show MCA stroke. Vascular Neuro following. Awaiting carotid US. Not candidate for intervention.    Review of Systems   Unable to perform ROS: Mental status change     Objective:     Vital Signs (Most Recent):  Temp: 96.7 °F (35.9 °C) (10/19/20 1237)  Pulse: 84 (10/19/20 1237)  Resp: 19 (10/19/20 1237)  BP: (!) 145/86 (10/19/20 1237)  SpO2: 96 % (10/19/20 1237) Vital Signs (24h Range):  Temp:  [96.2 °F (35.7 °C)-98.4 °F (36.9 °C)] 96.7 °F (35.9 °C)  Pulse:  [62-89] 84  Resp:  [16-37] 19  SpO2:  [93 %-98 %] 96 %  BP: (110-145)/(59-86) 145/86     Weight: 50.3 kg (110 lb 14.3 oz)  Body mass index is 17.9 kg/m².  No intake or output data in the 24 hours ending 10/19/20 1633   Physical Exam  Constitutional:       Appearance: She is well-developed.   HENT:      Head: Normocephalic and atraumatic.   Eyes:      Conjunctiva/sclera: Conjunctivae normal.      Pupils: Pupils are equal, round, and reactive to light.   Neck:      Musculoskeletal: Normal range of motion.      Vascular: No JVD.   Cardiovascular:      Rate and Rhythm: Normal rate and regular rhythm.      Heart sounds: Normal heart sounds.   Pulmonary:      Effort: Pulmonary effort is normal. No respiratory distress.      Breath sounds: No wheezing.   Abdominal:      General: Bowel sounds are normal. There is no distension.      Palpations: Abdomen is soft.      Tenderness: There is no abdominal tenderness. There is no guarding.   Musculoskeletal: Normal range of motion.         General: No tenderness.   Skin:     General: Skin is warm and dry.       Capillary Refill: Capillary refill takes less than 2 seconds.   Neurological:      General: No focal deficit present.      Mental Status: She is alert and oriented to person, place, and time.      Comments: Mild R facial droop  Upper/Lower extremity strength 5/5    Psychiatric:         Behavior: Behavior normal.         Significant Labs: All pertinent labs within the past 24 hours have been reviewed.    Significant Imaging: I have reviewed all pertinent imaging results/findings within the past 24 hours.

## 2020-10-19 NOTE — PLAN OF CARE
Patient is oriented but forgetful, Anguillan speaking, very hard of hearing, no drooping, no drift, standby assist, Neuro checks were negative. Vitals signs stable, safety and aspiration precautions maintained. No distress noted, complained of headaches and tingling in BUE and BLE reported per pt.

## 2020-10-19 NOTE — PT/OT/SLP EVAL
Speech Language Pathology Evaluation  Bedside Swallow    Patient Name:  Jessica Parkinson   MRN:  [id number]  Admitting Diagnosis: Stroke    Recommendations:                 General Recommendations:  monitor with PO intake and ensure safety with meal   Diet recommendations:  Mechanical soft, Thin   Swallow Precautions:  1. UPRIGHT for all meals and for all PO solid intake   2. mech soft Tray/ Thin Liquids  3. Straws ok/Cup swallows   4. Alternate sips/bites  5. Feed slowly  6. Assist for feeding  7. Crush po medications and bury in pudding     General Precautions: Standard, fall(Albanian speaking)  Communication strategies:  Does not appear to follow commands (not even in native lang), very Nikolai    History:     Principal Problem:Stroke     Chief Complaint:        Chief Complaint   Patient presents with    Facial Droop       grand daughter reports that at 1430 pt had trouble swallowing, drooling, and left sided facial droop.          HPI: Ms. Parkinson is a 89 yo  female with pmh of hypertension who presented to ED with complaint of right-sided facial droop that began at approximately 2:30 p.m. today.  Last known normal at 2:30 p.m. History collected from granddaughter who reports right-sided facial droop with dysarthria and difficulty tolerating secretions. No additional symptoms reported including weakness, numbness, vision change or headache.  Per granddaughter, patient's facial droop has improved slightly on arrival to the emergency department. No complaints on my exam.      CTH shows moderate involutional and chronic small vessel changes not inappropriate for age. No evidence of acute hemorrhage, mass or infarction. Labs unremarkable.  VSS. Patient evaluated by vascular neurology with recommendation for CVA workup. Pt admitted to [facility].      Past Medical History:   Diagnosis Date    Hypertension        History reviewed. No pertinent surgical history.    Social History: Patient lives with  family at home.     Prior Intubation HX:  n/a    Modified Barium Swallow: no hx of dysphagia per chart    CT: Moderate involutional and chronic small vessel changes not inappropriate for age.     No evidence of acute hemorrhage, mass or infarction.     Chest X-Rays: No large airspace opacity or lobar consolidation.  No pleural effusion or gross pneumothorax.     Prior diet: unknown no family present    Subjective     Pt seen this date for swallow eval, no family in room. Pt unable to follow or comprehend basic language in her native language. SLP will have to contact family to determine prior level.   Patient goals: none stated     Pain/Comfort:  · Pain Rating 1: (no overt signs of discomfort noted)    Objective:   Pt seen in room for swallow eval, she is alert and awake, cannot assess basic orientation, does not follow commands consistently.    Oral Musculature Evaluation  · Oral Musculature: general weakness  · Dentition: edentulous  · Mucosal Quality: adequate  · Mandibular Strength and Mobility: (fair)  · Oral Labial Strength and Mobility: (adequate)  · Lingual Strength and Mobility: (adequate)  · Buccal Strength and Mobility: decreased tone  · Volitional Cough: elicited  · Volitional Swallow: timely swallow  · Voice Prior to PO Intake: clear voice upon sustained phonation    Bedside Swallow Eval:   Consistencies Assessed:  · Thin liquids water by cup, straw  · Puree pudding by tsp  · Soft solids diced peaches     Oral Phase:   · Slow oral transit time   · Fair mastication  · Adequate labial seal and closure present  · No oral residue     Pharyngeal Phase:   · no overt clinical signs/symptoms of aspiration  · no overt clinical signs/symptoms of pharyngeal dysphagia  · multiple spontaneous swallows   · Fairly timely swallow noted   · No outward coughing/choking present with PO trials.     Compensatory Strategies  · Alternate sips and bites   · Slow rate of feeding  · Crush po meds for now    Treatment: monitor  with PO to ensure safety with meal and speak with family re: prior level     Assessment:     Jessica Parkinson is a 89 y.o. female admitted with Stroke who presents with an SLP diagnosis of no outward s/s of dysphagia, questionable baseline of cognition and communication at this time.     Goals:   Multidisciplinary Problems     SLP Goals     Not on file                Plan:     · Patient to be seen:  2 x/week   · Plan of Care expires:  11/17/20  · Plan of Care reviewed with:  patient   · SLP Follow-Up:  Yes       Discharge recommendations:  (tbd)   Barriers to Discharge:  none    Time Tracking:     SLP Treatment Date:   10/19/20  Speech Start Time:  0940  Speech Stop Time:  0952     Speech Total Time (min):  12 min    Billable Minutes: Eval Swallow and Oral Function 12    RASHMI Gagnon, CCC-SLP  10/19/2020

## 2020-10-19 NOTE — ASSESSMENT & PLAN NOTE
89 y/o woman with HTN who presents with right facial droop, slurred speech and difficulty tolerating secretion. Onset 1500.    -CTH shows moderate involutional and chronic small vessel changes not inappropriate for age. No evidence of acute hemorrhage, mass or infarction.   - mild rt sided facial droop noted on exam, no additional deficits noted   -MRI brain pending   -TTE pending   -Vascular neurology following  -A1C 5.6  -, HDL 15   -patient failed WILLIE- SLP eval and treat   -start ASA and atorvastatin when able to tolerate po   -PT/OT eval and treat

## 2020-10-19 NOTE — ASSESSMENT & PLAN NOTE
89 y/o woman with HTN who presents with right facial droop, slurred speech and difficulty tolerating secretion. Onset 1500.    -CTH shows moderate involutional and chronic small vessel changes not inappropriate for age. No evidence of acute hemorrhage, mass or infarction.   - mild rt sided facial droop noted on exam, no additional focal deficits noted although appears somewhat confused (possibly difficulty with communication, although PAYAL  used)  -MRI brain with deep left MCA stroke  -carotid US pending  -TTE w/o PFO, normal EF  -initiated on ASA 81, plavix 300->75mg daily; will give DAPT x21 days then ASA alone  -initiated on atorvastatin 80  -Vascular neurology following  -A1C 5.6  -, HDL 15   -SLP eval and treat   -PT/OT eval and treat

## 2020-10-19 NOTE — PLAN OF CARE
Cypriot speaking VN cued into room to complete admit assessment, VIP model introduced, VN working alongside bedside treatment team.  Plan of care reviewed with patient. Patient did not verbalize understanding Keri called for assistance with admit questions. Patient informed of fall risk and fall precautions, call light within reach, 2x bed rails. Patient and family notified to ask staff for assistance and pt verbalized complete understanding. Time allowed for questions. Will continue to monitor and intervene as needed.

## 2020-10-19 NOTE — ASSESSMENT & PLAN NOTE
Area of cytotoxic cerebral edema identified when reviewing brain imaging in the territory of the left middle cerebral artery.  There is no mass effect associated with it.  We will continue to monitor the patients clinical exam for any worsening of symptoms which may indicate expansion of the stroke or the area of the edema resulting in the clinical change.  The pattern is suggestive of small vessel or watershed etiology.  -Neuro exams every 4 hours

## 2020-10-19 NOTE — PLAN OF CARE
Patient off unit in MRI at time of assessment. TN met with patient's granddaughter Daily [phone] at bedside to complete discharge assessment.Per Daily, currently living [place], [place] and was down here visiting her son prior to admission. Daily, states that the patient was mostly independent with ADL's only needing assistance with showering and preparing meals. DME used at home includes a nebulizer. Patient has used HH int the past but is not current with HH.  PT/ OT recs are pending at this time.      TN updated whiteboard with contact information. Discharge brochure given Daily. TN will continue to follow throughout transitions of care and will assist with discharge needs.            10/19/20 1100   Discharge Assessment   Assessment Type Discharge Planning Assessment   Confirmed/corrected address and phone number on facesheet? Yes   Assessment information obtained from? Caregiver;Medical Record   Prior to hospitilization cognitive status: Alert/Oriented   Prior to hospitalization functional status: Needs Assistance;Independent  (Per granddaughter, patient needed assistance with showers otherwise independent)   Current cognitive status: Unable to Assess   Current Functional Status: Needs Assistance   Lives With grandchild(ramona)  (Daily [phone])   Able to Return to Prior Arrangements other (see comments)  (TBD)   Is patient able to care for self after discharge? Unable to determine at this time (comments)   Patient's perception of discharge disposition   (TBD)   Readmission Within the Last 30 Days no previous admission in last 30 days   Patient currently being followed by outpatient case management? No   Patient currently receives any other outside agency services? No   Equipment Currently Used at Home nebulizer   Do you have any problems affording any of your prescribed medications? No   Is the patient taking medications as prescribed? yes   Does the patient have transportation home? Yes   Transportation  Anticipated family or friend will provide   Does the patient receive services at the [facility]? No   Discharge Plan A   (TBD)   DME Needed Upon Discharge    (TBD)   Patient/Family in Agreement with Plan yes

## 2020-10-19 NOTE — NURSING
Stroke Neurovascular Consult done with Elsie Childers NP Via Telemedicine.  See consult note.  Patient educated on stroke s/s, Risk factors including HTN, HLD, and  medications including aspirin, atorvastatin, and blood pressure medications taking at home already.   Keri Smart present for consult and educated as well.

## 2020-10-19 NOTE — H&P
[facility] Hospital Medicine  History & Physical    Patient Name: Jessica Parkinson  MRN: [id number]  Admission Date: 10/18/2020  Attending Physician: Navneet Nugent, *   Primary Care Provider: Primary Doctor No         Patient information was obtained from patient, past medical records and ER records.     Subjective:     Principal Problem:Stroke    Chief Complaint:   Chief Complaint   Patient presents with    Facial Droop     grand daughter reports that at 1430 pt had trouble swallowing, drooling, and left sided facial droop.         HPI: Ms. Parkinson is a 89 yo  female with pmh of hypertension who presented to ED with complaint of right-sided facial droop that began at approximately 2:30 p.m. today.  Last known normal at 2:30 p.m. History collected from granddaughter who reports right-sided facial droop with dysarthria and difficulty tolerating secretions. No additional symptoms reported including weakness, numbness, vision change or headache.  Per granddaughter, patient's facial droop has improved slightly on arrival to the emergency department. No complaints on my exam.     CTH shows moderate involutional and chronic small vessel changes not inappropriate for age. No evidence of acute hemorrhage, mass or infarction. Labs unremarkable.  VSS. Patient evaluated by vascular neurology with recommendation for CVA workup. Pt admitted to [facility] hospital medicine.     Past Medical History:   Diagnosis Date    Hypertension        History reviewed. No pertinent surgical history.    Review of patient's allergies indicates:  No Known Allergies    No current facility-administered medications on file prior to encounter.      Current Outpatient Medications on File Prior to Encounter   Medication Sig    albuterol (PROVENTIL) 2.5 mg /3 mL (0.083 %) nebulizer solution Take 2.5 mg by nebulization every 6 (six) hours as needed for Wheezing. Rescue    amLODIPine (NORVASC) 10 MG tablet Take 10 mg by  mouth once daily.    aspirin 81 MG Chew Take 81 mg by mouth once daily.    losartan (COZAAR) 25 MG tablet Take 25 mg by mouth once daily.    metoprolol succinate (TOPROL-XL) 100 MG 24 hr tablet Take 100 mg by mouth once daily.    omeprazole (PRILOSEC) 20 MG capsule Take 20 mg by mouth once daily.     Family History     None        Tobacco Use    Smoking status: Never Smoker    Smokeless tobacco: Never Used   Substance and Sexual Activity    Alcohol use: Not on file    Drug use: Not on file    Sexual activity: Not on file     Review of Systems   Constitutional: Negative for chills, diaphoresis and fever.   Eyes: Negative for photophobia.   Respiratory: Negative for cough, chest tightness, shortness of breath and wheezing.    Cardiovascular: Negative for chest pain, palpitations and leg swelling.   Gastrointestinal: Negative for abdominal pain, diarrhea, nausea and vomiting.   Genitourinary: Negative for dysuria, flank pain, frequency and hematuria.   Musculoskeletal: Negative for back pain and myalgias.   Neurological: Positive for facial asymmetry. Negative for dizziness, syncope, light-headedness and headaches.   Psychiatric/Behavioral: Negative for confusion.     Objective:     Vital Signs (Most Recent):  Temp: 97.9 °F (36.6 °C) (10/19/20 0528)  Pulse: 62 (10/19/20 0714)  Resp: 16 (10/19/20 0528)  BP: (!) 110/59 (10/19/20 0528)  SpO2: 96 % (10/19/20 0528) Vital Signs (24h Range):  Temp:  [97.1 °F (36.2 °C)-98.4 °F (36.9 °C)] 97.9 °F (36.6 °C)  Pulse:  [62-89] 62  Resp:  [16-37] 16  SpO2:  [94 %-98 %] 96 %  BP: (110-141)/(59-75) 110/59     Weight: 50.6 kg (111 lb 8.8 oz)  Body mass index is 18.01 kg/m².    Physical Exam  Constitutional:       Appearance: She is well-developed.   HENT:      Head: Normocephalic and atraumatic.   Eyes:      Conjunctiva/sclera: Conjunctivae normal.      Pupils: Pupils are equal, round, and reactive to light.   Neck:      Musculoskeletal: Normal range of motion.      Vascular:  No JVD.   Cardiovascular:      Rate and Rhythm: Normal rate and regular rhythm.      Heart sounds: Normal heart sounds.   Pulmonary:      Effort: Pulmonary effort is normal. No respiratory distress.      Breath sounds: No wheezing.   Abdominal:      General: Bowel sounds are normal. There is no distension.      Palpations: Abdomen is soft.      Tenderness: There is no abdominal tenderness. There is no guarding.   Musculoskeletal: Normal range of motion.         General: No tenderness.   Skin:     General: Skin is warm and dry.      Capillary Refill: Capillary refill takes less than 2 seconds.   Neurological:      General: No focal deficit present.      Mental Status: She is alert and oriented to person, place, and time.      Comments: Mild R facial droop  Upper/Lower extremity strength 5/5    Psychiatric:         Behavior: Behavior normal.           CRANIAL NERVES     CN III, IV, VI   Pupils are equal, round, and reactive to light.       Significant Labs:   BMP:   Recent Labs   Lab 10/19/20  0322         K 4.2      CO2 25   BUN 9*   CREATININE 0.7   CALCIUM 8.7   MG 2.3     CBC:   Recent Labs   Lab 10/18/20  1800 10/19/20  0322   WBC 7.57 6.62   HGB 13.4 12.0   HCT 40.9 36.2*    266       Significant Imaging: I have reviewed all pertinent imaging results/findings within the past 24 hours.    Assessment/Plan:     * Stroke  89 y/o woman with HTN who presents with right facial droop, slurred speech and difficulty tolerating secretion. Onset 1500.    -CTH shows moderate involutional and chronic small vessel changes not inappropriate for age. No evidence of acute hemorrhage, mass or infarction.   - mild rt sided facial droop noted on exam, no additional deficits noted   -MRI brain pending   -TTE pending   -Vascular neurology following  -A1C 5.6  -, HDL 15   -patient failed WILLIE- SLP eval and treat   -start ASA and atorvastatin when able to tolerate po   -PT/OT eval and treat        Essential hypertension  -140  Taking amlodipine 10 mg daily, metoprolol 100 mg daily and losartan 25 mg daily  Avoid aggressive BP management for now   Allow permissive HTN         VTE Risk Mitigation (From admission, onward)         Ordered     enoxaparin injection 40 mg  Every 24 hours      10/18/20 1848     IP VTE HIGH RISK PATIENT  Once      10/18/20 1848     Place sequential compression device  Until discontinued      10/18/20 1848                   Kaylee Crowder NP  Department of Hospital Medicine   [facility]

## 2020-10-19 NOTE — PT/OT/SLP PROGRESS
Occupational Therapy  Visit Attempt     Patient Name:  Jessica Parkinson   MRN:  [id number]    Patient not seen today secondary to Other (Comment)(nsg present for tele stroke evaluation). Will follow-up as available.    Seema Morrison OT  10/19/2020

## 2020-10-20 VITALS
BODY MASS INDEX: 17.82 KG/M2 | RESPIRATION RATE: 16 BRPM | TEMPERATURE: 97 F | DIASTOLIC BLOOD PRESSURE: 75 MMHG | HEART RATE: 80 BPM | WEIGHT: 110.88 LBS | OXYGEN SATURATION: 97 % | HEIGHT: 66 IN | SYSTOLIC BLOOD PRESSURE: 138 MMHG

## 2020-10-20 PROBLEM — K59.01 SLOW TRANSIT CONSTIPATION: Status: ACTIVE | Noted: 2020-10-20

## 2020-10-20 LAB
ALBUMIN SERPL BCP-MCNC: 4 G/DL (ref 3.5–5.2)
ALP SERPL-CCNC: 86 U/L (ref 55–135)
ALT SERPL W/O P-5'-P-CCNC: 13 U/L (ref 10–44)
ANION GAP SERPL CALC-SCNC: 12 MMOL/L (ref 8–16)
AST SERPL-CCNC: 21 U/L (ref 10–40)
BASOPHILS # BLD AUTO: 0.03 K/UL (ref 0–0.2)
BASOPHILS NFR BLD: 0.6 % (ref 0–1.9)
BILIRUB SERPL-MCNC: 1.4 MG/DL (ref 0.1–1)
BUN SERPL-MCNC: 14 MG/DL (ref 10–30)
CALCIUM SERPL-MCNC: 8.9 MG/DL (ref 8.7–10.5)
CHLORIDE SERPL-SCNC: 101 MMOL/L (ref 95–110)
CO2 SERPL-SCNC: 22 MMOL/L (ref 23–29)
CREAT SERPL-MCNC: 0.8 MG/DL (ref 0.5–1.4)
DIFFERENTIAL METHOD: ABNORMAL
EOSINOPHIL # BLD AUTO: 0.3 K/UL (ref 0–0.5)
EOSINOPHIL NFR BLD: 4.8 % (ref 0–8)
ERYTHROCYTE [DISTWIDTH] IN BLOOD BY AUTOMATED COUNT: 13.3 % (ref 11.5–14.5)
EST. GFR  (AFRICAN AMERICAN): >60 ML/MIN/1.73 M^2
EST. GFR  (NON AFRICAN AMERICAN): >60 ML/MIN/1.73 M^2
GLUCOSE SERPL-MCNC: 99 MG/DL (ref 70–110)
HCT VFR BLD AUTO: 42.4 % (ref 37–48.5)
HGB BLD-MCNC: 14 G/DL (ref 12–16)
IMM GRANULOCYTES # BLD AUTO: 0.01 K/UL (ref 0–0.04)
IMM GRANULOCYTES NFR BLD AUTO: 0.2 % (ref 0–0.5)
LYMPHOCYTES # BLD AUTO: 1.9 K/UL (ref 1–4.8)
LYMPHOCYTES NFR BLD: 35.8 % (ref 18–48)
MCH RBC QN AUTO: 29.7 PG (ref 27–31)
MCHC RBC AUTO-ENTMCNC: 33 G/DL (ref 32–36)
MCV RBC AUTO: 90 FL (ref 82–98)
MONOCYTES # BLD AUTO: 0.4 K/UL (ref 0.3–1)
MONOCYTES NFR BLD: 8.3 % (ref 4–15)
NEUTROPHILS # BLD AUTO: 2.6 K/UL (ref 1.8–7.7)
NEUTROPHILS NFR BLD: 50.3 % (ref 38–73)
NRBC BLD-RTO: 0 /100 WBC
PLATELET # BLD AUTO: 299 K/UL (ref 150–350)
PMV BLD AUTO: 8.5 FL (ref 9.2–12.9)
POTASSIUM SERPL-SCNC: 4 MMOL/L (ref 3.5–5.1)
PROT SERPL-MCNC: 7.6 G/DL (ref 6–8.4)
RBC # BLD AUTO: 4.72 M/UL (ref 4–5.4)
SODIUM SERPL-SCNC: 135 MMOL/L (ref 136–145)
WBC # BLD AUTO: 5.19 K/UL (ref 3.9–12.7)

## 2020-10-20 PROCEDURE — 85025 COMPLETE CBC W/AUTO DIFF WBC: CPT

## 2020-10-20 PROCEDURE — 97530 THERAPEUTIC ACTIVITIES: CPT

## 2020-10-20 PROCEDURE — 36415 COLL VENOUS BLD VENIPUNCTURE: CPT

## 2020-10-20 PROCEDURE — 25000003 PHARM REV CODE 250: Performed by: INTERNAL MEDICINE

## 2020-10-20 PROCEDURE — 97116 GAIT TRAINING THERAPY: CPT

## 2020-10-20 PROCEDURE — 25000003 PHARM REV CODE 250: Performed by: NURSE PRACTITIONER

## 2020-10-20 PROCEDURE — 92526 ORAL FUNCTION THERAPY: CPT

## 2020-10-20 PROCEDURE — 25000003 PHARM REV CODE 250: Performed by: HOSPITALIST

## 2020-10-20 PROCEDURE — 80053 COMPREHEN METABOLIC PANEL: CPT

## 2020-10-20 PROCEDURE — 94761 N-INVAS EAR/PLS OXIMETRY MLT: CPT

## 2020-10-20 RX ORDER — POLYETHYLENE GLYCOL 3350 17 G/17G
17 POWDER, FOR SOLUTION ORAL DAILY
Status: DISCONTINUED | OUTPATIENT
Start: 2020-10-20 | End: 2020-10-20 | Stop reason: HOSPADM

## 2020-10-20 RX ORDER — DOCUSATE SODIUM 100 MG/1
100 CAPSULE, LIQUID FILLED ORAL 2 TIMES DAILY
Status: DISCONTINUED | OUTPATIENT
Start: 2020-10-20 | End: 2020-10-20 | Stop reason: HOSPADM

## 2020-10-20 RX ORDER — CLOPIDOGREL BISULFATE 75 MG/1
75 TABLET ORAL DAILY
Qty: 30 TABLET | Refills: 11 | Status: SHIPPED | OUTPATIENT
Start: 2020-10-21 | End: 2020-10-27

## 2020-10-20 RX ORDER — DOCUSATE SODIUM 100 MG/1
100 CAPSULE, LIQUID FILLED ORAL 2 TIMES DAILY
Refills: 0
Start: 2020-10-20

## 2020-10-20 RX ORDER — ATORVASTATIN CALCIUM 80 MG/1
80 TABLET, FILM COATED ORAL DAILY
Qty: 90 TABLET | Refills: 3 | Status: SHIPPED | OUTPATIENT
Start: 2020-10-21 | End: 2021-10-21

## 2020-10-20 RX ORDER — POLYETHYLENE GLYCOL 3350 17 G/17G
17 POWDER, FOR SOLUTION ORAL DAILY
Refills: 0
Start: 2020-10-20

## 2020-10-20 RX ADMIN — DOCUSATE SODIUM 100 MG: 100 CAPSULE, LIQUID FILLED ORAL at 11:10

## 2020-10-20 RX ADMIN — POLYETHYLENE GLYCOL (3350) 17 G: 17 POWDER, FOR SOLUTION ORAL at 11:10

## 2020-10-20 RX ADMIN — ASPIRIN 81 MG 81 MG: 81 TABLET ORAL at 08:10

## 2020-10-20 RX ADMIN — PANTOPRAZOLE SODIUM 40 MG: 40 TABLET, DELAYED RELEASE ORAL at 08:10

## 2020-10-20 RX ADMIN — ATORVASTATIN CALCIUM 80 MG: 40 TABLET, FILM COATED ORAL at 08:10

## 2020-10-20 RX ADMIN — CLOPIDOGREL 75 MG: 75 TABLET, FILM COATED ORAL at 08:10

## 2020-10-20 NOTE — PLAN OF CARE
Pt awake alert but confused to time, situation and place. Pt thought that is in [place] [facility]. Pt is very [place], Grand daughter at bedside to help with translation. Neuro check negative, right pupil fixed, pt had eye surgery in the past. Generalized ecchymosis noted. Pt ambulates with 1 assistance to the bathroom. VSS, no acute distress noted. NSR on tele monitor. Cont to monitor.    Problem: Fall Injury Risk  Goal: Absence of Fall and Fall-Related Injury  Outcome: Ongoing, Progressing     Problem: Adjustment to Illness (Stroke, Ischemic/Transient Ischemic Attack)  Goal: Optimal Coping  Outcome: Ongoing, Progressing     Problem: Communication Impairment (Stroke, Ischemic/Transient Ischemic Attack)  Goal: Improved Communication Skills  Outcome: Ongoing, Progressing     Problem: Eating/Swallowing Impairment (Stroke, Ischemic/Transient Ischemic Attack)  Goal: Oral Intake without Aspiration  Outcome: Ongoing, Progressing     Problem: Adult Inpatient Plan of Care  Goal: Plan of Care Review  Outcome: Ongoing, Progressing     Problem: Skin Injury Risk Increased  Goal: Skin Health and Integrity  Outcome: Ongoing, Progressing

## 2020-10-20 NOTE — PT/OT/SLP PROGRESS
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  Patient goals: none stated     Pain/Comfort:  · Pain Rating 1: 0/10    Objective:     Has the patient been evaluated by SLP for swallowing?   Yes  Keep patient NPO? No   Current Respiratory Status: room air      Cognition/Communication: Pt seen in room, family at bedside. Pt is now talkative, oriented to self, place and family in room. Pt able to follow commands and have conversation with family in room. Granddtr reports she is at baseline for communication.     Swallowing: Pt consumed at least 50% of breakfast, eggs and grits. No issues reported with swallowing, pt able to take meds with water. No audible s/s of dysphagia reported per family.  Pt able to maintain current diet level. Pt with DC orders for home this date.     Assessment:     Jessica Parkinson is a 89 y.o. female admitted with Thrombotic stroke involving left middle cerebral artery who presents with an SLP diagnosis of no audible dysphagia signs and cognition/communication is at baseline.      Goals:   Multidisciplinary Problems     SLP Goals     Not on file                Plan:     · Plan of Care expires:  11/17/20  · Plan of Care reviewed with:  patient, grandchild(ramona)   · SLP Follow-Up:  No       Discharge recommendations:  home health PT, home health OT   Barriers to Discharge:  none    Time Tracking:     SLP Treatment Date:   10/20/20  Speech Start Time:  1128  Speech Stop Time:  1138     Speech Total Time (min):  10 min    Billable Minutes:  Treatment Swallowing Dysfunction 10    RASHMI Gagnon, CCC-SLP  10/20/2020

## 2020-10-20 NOTE — PLAN OF CARE
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       No future appointments.        I have seen and examined this patient face to face today. My clinical findings that support the need for the home health skilled services and home bound status are the following:  Weakness/numbness causing balance and gait disturbance due to Stroke making it taxing to leave home.    Allergies:Review of patient's allergies indicates:  No Known Allergies    Diet: soft diet    Activities: activity as tolerated    Nursing:   SN to complete comprehensive assessment including routine vital signs. Instruct on disease process and s/s of complications to report to MD. Review/verify medication list sent home with the patient at time of discharge  and instruct patient/caregiver as needed. Frequency may be adjusted depending on start of care date.    Notify MD if SBP > 160 or < 90; DBP > 90 or < 50; HR > 120 or < 50; Temp > 101      CONSULTS:    Physical Therapy to evaluate and treat. Evaluate for home safety and equipment needs; Establish/upgrade home exercise program. Perform / instruct on therapeutic exercises, gait training, transfer training, and Range of Motion.  Occupational Therapy to evaluate and treat. Evaluate home environment for safety and equipment needs. Perform/Instruct on transfers, ADL training, ROM, and  therapeutic exercises.  Aide to provide assistance with personal care, ADLs, and vital signs.    MISCELLANEOUS CARE:  N/A    WOUND CARE ORDERS  n/a      Medications: Review discharge medications with patient and family and provide education.      Current Discharge Medication List      START taking these medications    Details   atorvastatin (LIPITOR) 80 MG tablet Take 1 tablet (80 mg total) by mouth once daily.  Qty: 90 tablet, Refills: 3      clopidogreL (PLAVIX) 75 mg tablet Take 1 tablet (75 mg total) by mouth once daily.  Qty: 30 tablet, Refills: 11         CONTINUE these medications which have NOT CHANGED    Details   albuterol (PROVENTIL) 2.5 mg /3 mL (0.083 %) nebulizer solution Take 2.5 mg by nebulization every 6 (six) hours as needed for Wheezing. Rescue      aspirin 81 MG Chew Take 81 mg by mouth once daily.      omeprazole (PRILOSEC) 20 MG capsule Take 20 mg by mouth once daily.         STOP taking these medications       amLODIPine (NORVASC) 10 MG tablet Comments:   Reason for Stopping:         losartan (COZAAR) 25 MG tablet Comments:   Reason for Stopping:         metoprolol succinate (TOPROL-XL) 100 MG 24 hr tablet Comments:   Reason for Stopping:               I certify that this patient is confined to her home and needs intermittent skilled nursing care, physical therapy and occupational therapy.

## 2020-10-20 NOTE — PLAN OF CARE
Plan of care reviewed with patient and granddaughter. Patient and granddaughter verbalized understanding.neuro checks done every 4 hours. Telemetry applied. Zero true red alarms or ectopy. Bed locked and low, call light within reach, stroke education reviewed with grand daughter. Packet given, side rails z2, bishnu at bed side, non skid socks applied, fall risk band applied. Instructed to call if needing assistance.Will continue to monitor.

## 2020-10-20 NOTE — HOSPITAL COURSE
She was placed in the hospital and had a stroke work up. Permissive hypertension  MRI- 1 cm acute lacunar infarct in the white matter of the left frontal lobe   Background of atrophy and prominent periventricular white matter changes   Probable developmental venous anomaly lateral left cerebellum subjacent encephalomalacia and signal changes suggesting remote hemorrhage.  ECHO-  · The left ventricle is normal in size. The estimated ejection fraction is 65%.  · Grade I diastolic dysfunction.  · Mild mitral regurgitation.  · Mild to moderate tricuspid regurgitation.  · Mild aortic regurgitation.  · There is no evidence of intracardiac shunting.  · Normal central venous pressure (3 mmHg).  The estimated PA systolic pressure is 50 mmHg.  US Carotid negative  ASA, Statin   Vascular neurology was consulted.  She was seen by PT/OT. Was able to be safely discharged home with family with [facility].    On the day of discharge:  Patient's daughter and granddaughter are at the bedside  Her only complaints is constipation with no BM x 6-8 days  Denies nausea or abdominal pain  Moves everything with no focal deficits noted  Her family states that she is back to baseline.  They are going back to [place] but I have advised that they stay here for a week  Three  BP meds stopped.  Likely will not need any of these but will need close follow up

## 2020-10-20 NOTE — PLAN OF CARE
Referral sent to [facility] .  Pt and granddaughter Daily, [phone] are here visiting family.  They will stay a few extra days as requested by Dr. Lee.  I will set up home health  and [facility] for pt.  The only reason I am not setting up neuro f/u is because pt is returning to her home to [place], [place] in a few days.  Daily says pt see PCP and cardiologist in [place], [place].  We discussed importance of pt following up with neurology.  Daily will call PCP for neuro referral.         10/20/20 1203   Post-Acute Status   Post-Acute Authorization Home Health   Home Health Status Referrals Sent     Gilberto Charles RN,   [phone]

## 2020-10-20 NOTE — PT/OT/SLP PROGRESS
Physical Therapy Treatment    Patient Name:  Jessica Parkinson   MRN:  [id number]    Recommendations:     Discharge Recommendations:  home(with granddtr)   Discharge Equipment Recommendations: none   Barriers to discharge: None    Assessment:     Jessica Parkinson is a 89 y.o. female admitted with a medical diagnosis of Thrombotic stroke involving left middle cerebral artery.  She presents with the following impairments/functional limitations:  impaired endurance, impaired balance(pt does not want to use AD; grandtr provides SBA/CG) Patient has grossly met goals for d/c; strength 5/5 BLEs, functioning at baseline; oriented x 3; amb with SBA in room- fatigues with activity, denies need for home health therapy; pt has  needed equipment at home- uses shower chair and has access to RW and SPC; will recommend d/c home with granddtr; no other needs for equip or therapy..    Rehab Prognosis: Good; patient would benefit from acute skilled PT services to address these deficits and reach maximum level of function.    Recent Surgery: * No surgery found *      Plan:     During this hospitalization, patient to be seen   to address the identified rehab impairments via gait training, therapeutic activities, therapeutic exercises, neuromuscular re-education and progress toward the following goals:    · Plan of Care Expires:  11/19/20    Subjective     Chief Complaint: no complaints except fatigued at end of session  Patient/Family Comments/goals: to go home  Pain/Comfort:  · Pain Rating 1: 0/10  · Pain Rating Post-Intervention 1: 0/10      Objective:     Communicated with nurse prior to session.  Patient found HOB elevated with peripheral IV upon PT entry to room.     General Precautions: Standard, fall   Orthopedic Precautions:N/A   Braces: N/A     Functional Mobility:  · Bed Mobility:     · Rolling Right: modified independence  · Scooting: modified independence  · Supine to Sit: modified independence  · Transfers:     · Sit to  Stand:  stand by assistance with no AD  · Gait: Patient amb in room forward, backward, laterally to L, laterally to R and turning ~35ft with SBA/CG  · Balance: sit~good, stand~good; amb ~fair/fair+      AM-PAC 6 CLICK MOBILITY  Turning over in bed (including adjusting bedclothes, sheets and blankets)?: 4  Sitting down on and standing up from a chair with arms (e.g., wheelchair, bedside commode, etc.): 3  Moving from lying on back to sitting on the side of the bed?: 4  Moving to and from a bed to a chair (including a wheelchair)?: 3  Need to walk in hospital room?: 3  Climbing 3-5 steps with a railing?: 3  Basic Mobility Total Score: 20       Therapeutic Activities and Exercises:   Patient transitioned to EOB [place]; pt without facial droop, able to move tongue side to side after slight deviation to L when first sticking tongue out, slight deviation to L with pucker; BLEs 5/5, alternate tapping and heel to shin intact; sit to stand with SBA; pt able to reach across and laterally in different quadrants without LOB; pt amb as described above; tolerated with fatigue following; with activity, -115 O2 sats 96%; following activity, /82  O2 sats 100%    Patient left sitting at EOB with granddtr with all lines intact, call button in reach, nurse notified and granddtr present..    GOALS:   Multidisciplinary Problems     Physical Therapy Goals     Not on file          Multidisciplinary Problems (Resolved)        Problem: Physical Therapy Goal    Goal Priority Disciplines Outcome Goal Variances Interventions   Physical Therapy Goal   (Resolved)     PT, PT/OT Met     Description: Goals to be met by: 2020     Patient will increase functional independence with mobility by performin. Bed to chair transfer with Stand-by Assistance/Contact Guard -met 10/20  2. Gait  x 75 feet with Stand-by Assistance and Contact Guard Assistance -met for assistance, not for distance  3. Lower extremity exercise program  x10 reps with supervision-n/a                       Time Tracking:     PT Received On: 10/20/20  PT Start Time: 1016     PT Stop Time: 1039  PT Total Time (min): 23 min     Billable Minutes: Gait Training 8 minutes and Therapeutic Activity 15 minutes    Treatment Type: Treatment  PT/PTA: PT     PTA Visit Number: 0     [place] Simon, PT  10/20/2020

## 2020-10-20 NOTE — PLAN OF CARE
Problem: Physical Therapy Goal  Goal: Physical Therapy Goal  Description: Goals to be met by: 2020     Patient will increase functional independence with mobility by performin. Bed to chair transfer with Stand-by Assistance/Contact Guard -met 10/20  2. Gait  x 75 feet with Stand-by Assistance and Contact Guard Assistance -met for assistance, not for distance  3. Lower extremity exercise program x10 reps with supervision-n/a      Outcome: Met   Patient has grossly met goals for d/c; strength 5/5 BLEs, functioning at baseline; oriented x 3; amb with SBA in room- fatigues with activity, denies need for home health therapy; pt has  needed equipment at home- uses shower chair and has access to RW and SPC; will recommend d/c home with granddtr; no other needs for equip or therapy.

## 2020-10-20 NOTE — PLAN OF CARE
Pending [facility]  acceptance.  Otherwise, pt is ready for discharge from my standpoint.  Her granddaughter will drive her home.  Rounds completed on pt.  All questions addressed.  Bedside nurse to discuss d/c medications.  Discussed importance to attend all f/u appts and take medications as prescribed.  Verbalized understanding.  Neuro appt not scheduled 2/2 pt will leave to return home to [place], [place] in a couple of days and will contact her PCP to establish with a neurologist.      Future Appointments   Date Time Provider Department Center   10/27/2020  3:00 PM Audrey Mariee MD [facility]        10/20/20 1323   Post-Acute Status   Post-Acute Authorization Home Health     Gilberto Charles RN, CM  [phone]

## 2020-10-21 ENCOUNTER — PATIENT OUTREACH (OUTPATIENT)
Dept: ADMINISTRATIVE | Facility: CLINIC | Age: 85
End: 2020-10-21

## 2020-10-21 PROCEDURE — G0180 MD CERTIFICATION HHA PATIENT: HCPCS | Mod: ,,, | Performed by: INTERNAL MEDICINE

## 2020-10-21 PROCEDURE — G0180 PR HOME HEALTH MD CERTIFICATION: ICD-10-PCS | Mod: ,,, | Performed by: INTERNAL MEDICINE

## 2020-10-21 NOTE — PATIENT INSTRUCTIONS
Stroke (Completed)    You have had a mild stroke, or cerebrovascular accident (CVA). This is caused by a loss of blood flow to part of your brain. This can occur when a blood clot forms inside the carotid artery (main artery from the heart to the brain) or inside the heart. When the clot travels to the brain, it can lodge in a blood vessel and block blood flow. The other common cause of stroke is a gradual narrowing of the arteries in the brain due to buildup of fatty deposits (plaque).  Symptoms  Blocked blood flow in different areas of the brain can cause different symptoms. If you have had a stroke before, a new one may be different. A memory aid for the basic signs of a stroke is F.A.S.T.  F.A.S.T.  · F: Face drooping, or numbness on one side. This may be more noticeable when you ask the affected person to smile.  · A: Arm weakness or numbness. The affected person may have trouble using or lifting one side.  · S: Speech difficulty. Speech may be slurred or hard to understand. The affected person may also use the wrong words.  · T: Time to call 911. Time is critical in treating a stroke. Call 911 as soon as you suspect a stroke has happened--even a small one. The sooner treatment is started the better, even if the symptoms go away.  Other common symptoms of a stroke include:  · Having difficulty getting the right words to come out  · Weakness in one leg  · Numbness on one side  · Difficulty walking  · Trouble with coordination  · Trouble with vision  · Headache  · Confusion  · Dizziness  Treatment  After you have had a stroke, you are at risk of having another. Be sure to follow up with your healthcare provider for further evaluation and treatment. If problems are found, your healthcare provider will recommend treatment with medicines and/or procedures.  To reduce your chance of having another stroke, you may be prescribed medicines. These include medicines to prevent blood clots, such as antiplatelet or  anticoagulant medicines.  Home care  · Rest at home and avoid exertion for the next few days.  · If your healthcare provider has prescribed medicines, take them as directed.  Follow-up care  Follow up with your healthcare provider, or as advised. Additional tests may be needed. If you had an X-ray, CT scan, MRI, or ECG (electrocardiogram), it will be reviewed by a specialist. You will be notified of any new findings that will affect your care.  Call 911  Contact emergency services right away if any of these occur:  · Any of your stroke symptoms worsen  · New problems with speech, confusion, vision, walking, coordination, facial droop, or weakness or numbness on one side of your body  · Severe headache, fainting spell, dizziness, or seizure  · Chest pain or shortness of breath  Remember F.A.S.T. (described above). If you notice warning signs and symptoms of stroke, CALL 911 without delay.  Date Last Reviewed: 9/21/2015  © 8712-1638 [employer]. [address] [place] [address], [place] [address]. All rights reserved. This information is not intended as a substitute for professional medical care. Always follow your healthcare professional's instructions.

## 2020-10-21 NOTE — PLAN OF CARE
Patient asleep. Granddaughter has received discharge instructions. Prescriptions received. Instructions reviewed with granddaughter using teachback method. All questions answered to granddaughter's satisfactionl. IV access and telemetry removed per floor nurse. Transport requested for discharge.

## 2020-10-23 ENCOUNTER — DOCUMENT SCAN (OUTPATIENT)
Dept: HOME HEALTH SERVICES | Facility: HOSPITAL | Age: 85
End: 2020-10-23

## 2020-10-23 ENCOUNTER — DOCUMENT SCAN (OUTPATIENT)
Dept: HOME HEALTH SERVICES | Facility: HOSPITAL | Age: 85
End: 2020-10-23
Payer: MEDICAID

## 2020-10-27 ENCOUNTER — OFFICE VISIT (OUTPATIENT)
Dept: PRIMARY CARE CLINIC | Facility: CLINIC | Age: 85
End: 2020-10-27
Payer: MEDICARE

## 2020-10-27 VITALS
SYSTOLIC BLOOD PRESSURE: 155 MMHG | HEART RATE: 99 BPM | BODY MASS INDEX: 17.86 KG/M2 | WEIGHT: 110.69 LBS | OXYGEN SATURATION: 92 % | DIASTOLIC BLOOD PRESSURE: 83 MMHG | TEMPERATURE: 97 F

## 2020-10-27 DIAGNOSIS — I63.312 THROMBOTIC STROKE INVOLVING LEFT MIDDLE CEREBRAL ARTERY: ICD-10-CM

## 2020-10-27 DIAGNOSIS — I63.9 CEREBROVASCULAR ACCIDENT (CVA), UNSPECIFIED MECHANISM: Primary | ICD-10-CM

## 2020-10-27 DIAGNOSIS — I10 ESSENTIAL HYPERTENSION: ICD-10-CM

## 2020-10-27 PROBLEM — G93.6 CYTOTOXIC CEREBRAL EDEMA: Status: RESOLVED | Noted: 2020-10-19 | Resolved: 2020-10-27

## 2020-10-27 PROBLEM — R29.810 FACIAL DROOP: Status: RESOLVED | Noted: 2020-10-18 | Resolved: 2020-10-27

## 2020-10-27 PROCEDURE — 99213 OFFICE O/P EST LOW 20 MIN: CPT | Mod: PBBFAC,PO | Performed by: INTERNAL MEDICINE

## 2020-10-27 PROCEDURE — 99999 PR PBB SHADOW E&M-EST. PATIENT-LVL III: ICD-10-PCS | Mod: PBBFAC,,, | Performed by: INTERNAL MEDICINE

## 2020-10-27 PROCEDURE — 99496 TRANSJ CARE MGMT HIGH F2F 7D: CPT | Mod: S$PBB,,, | Performed by: INTERNAL MEDICINE

## 2020-10-27 PROCEDURE — 99496 TRANSITIONAL CARE MANAGE SERVICE 7 DAY DISCHARGE: ICD-10-PCS | Mod: S$PBB,,, | Performed by: INTERNAL MEDICINE

## 2020-10-27 PROCEDURE — 99999 PR PBB SHADOW E&M-EST. PATIENT-LVL III: CPT | Mod: PBBFAC,,, | Performed by: INTERNAL MEDICINE

## 2020-10-27 RX ORDER — LOSARTAN POTASSIUM 50 MG/1
50 TABLET ORAL DAILY
Qty: 90 TABLET | Refills: 3 | Status: SHIPPED | OUTPATIENT
Start: 2020-10-27 | End: 2021-10-27

## 2020-10-27 RX ORDER — CLOPIDOGREL BISULFATE 75 MG/1
75 TABLET ORAL DAILY
Qty: 30 TABLET | Refills: 12
Start: 2020-10-27 | End: 2021-11-21

## 2020-10-27 NOTE — PROGRESS NOTES
[facility]   New Visit Progress Note   Recent Hospital Discharge     PRESENTING HISTORY     Chief Complaint/Reason for Admission:  Follow up Hospital Discharge   PCP: Primary Doctor No    History of Present Illness:  Ms. Jessica Parkinson is a 89 y.o. female who was recently admitted to the hospital.    [facility] Medicine  Progress Note     Patient Name: Jessica Parkinson  MRN: [id number]  Patient Class: IP- Inpatient     Admission Date: 10/18/2020  Discharge Date: 10/20/2020  Attending Physician: Navneet Nugent, *  Primary Care Provider: Primary Doctor No  ___________________________________________________________________    Today:  Presents to [facility] for initial hospital follow up.  Recently hospitalized for acute CVA.  Presented with R facial droop, slurred speech, and difficulty managing secretions.    tPA deferred as symptoms were not disabling.   MCA stroke noted on MRI.   Statin, Plavix, Aspirin initiated.  Patient seen by speech therapy team- cleared for mechanical soft diet with thin liquids.  PT/OT team recommended home health.  Patient discharged to home with [facility].     Patient accompanied today by her granddaughter.  Patient visiting from [place] and plans to return home in about 2-3 weeks.  Has established primary care and cardiology team in [place].   Reports compliance with all medication.  No swallowing difficulty.  Tolerating oral intake.  Ambulating without difficulty.  Has been released from home PT services as goals have been met.    Patient is [place] speaking.  Granddaughter acting as .     Review of Systems  General ROS: negative for chills, fever or weight loss  Psychological ROS: negative for hallucination, depression or suicidal ideation  Ophthalmic ROS: negative for blurry vision, photophobia or eye pain  ENT ROS: negative for epistaxis, sore throat or rhinorrhea  + hearing impaired   Respiratory ROS: no cough, shortness of  breath, or wheezing  Cardiovascular ROS: no chest pain or dyspnea on exertion  Gastrointestinal ROS: no abdominal pain, change in bowel habits, or black/ bloody stools  Genito-Urinary ROS: no dysuria, trouble voiding, or hematuria  Musculoskeletal ROS: negative for gait disturbance or muscular weakness  Neurological ROS: no syncope or seizures; no ataxia  + mild headache   Dermatological ROS: negative for pruritis, rash and jaundice      PAST HISTORY:     Past Medical History:   Diagnosis Date    Hypertension        No past surgical history on file.    No family history on file.      MEDICATIONS & ALLERGIES:     Current Outpatient Medications on File Prior to Visit   Medication Sig Dispense Refill    albuterol (PROVENTIL) 2.5 mg /3 mL (0.083 %) nebulizer solution Take 2.5 mg by nebulization every 6 (six) hours as needed for Wheezing. Rescue      aspirin 81 MG Chew Take 81 mg by mouth once daily.      atorvastatin (LIPITOR) 80 MG tablet Take 1 tablet (80 mg total) by mouth once daily. 90 tablet 3    clopidogreL (PLAVIX) 75 mg tablet Take 1 tablet (75 mg total) by mouth once daily. 30 tablet 11    docusate sodium (COLACE) 100 MG capsule Take 1 capsule (100 mg total) by mouth 2 (two) times daily.  0    omeprazole (PRILOSEC) 20 MG capsule Take 20 mg by mouth once daily.      polyethylene glycol (GLYCOLAX) 17 gram PwPk Take 17 g by mouth once daily.  0     No current facility-administered medications on file prior to visit.         Review of patient's allergies indicates:  No Known Allergies    OBJECTIVE:     Vital Signs:  BP (!) 155/83 (BP Location: Right arm, Patient Position: Sitting, BP Method: Medium (Automatic))   Pulse 99   Temp 97.3 °F (36.3 °C) (Oral)   Wt 50.2 kg (110 lb 10.7 oz)   SpO2 (!) 92%   BMI 17.86 kg/m²   Wt Readings from Last 1 Encounters:   10/19/20 1300 50.3 kg (110 lb 14.3 oz)   10/19/20 1131 50.3 kg (111 lb)   10/18/20 2057 50.6 kg (111 lb 8.8 oz)     Body mass index is 17.86 kg/m².         Physical Exam:  BP (!) 155/83 (BP Location: Right arm, Patient Position: Sitting, BP Method: Medium (Automatic))   Pulse 99   Temp 97.3 °F (36.3 °C) (Oral)   Wt 50.2 kg (110 lb 10.7 oz)   SpO2 (!) 92%   BMI 17.86 kg/m²   General appearance: alert, cooperative, no distress  Constitutional:Oriented to person, place, and time  + appears well-developed and well-nourished.   HEENT: Normocephalic, atraumatic, neck symmetrical, no nasal discharge   + hard of hearing   Eyes: conjunctivae/corneas clear, PERRL, EOM's intact  Lungs: clear to auscultation bilaterally, no dullness to percussion bilaterally  Heart: regular rate and rhythm without rub; no displacement of the PMI   Abdomen: soft, non-tender; bowel sounds normoactive; no organomegaly  Extremities: extremities symmetric; no clubbing, cyanosis, or edema  Integument: Skin color, texture, turgor normal; no rashes; hair distrubution normal  Neurologic: Alert and oriented X 3, normal strength, normal coordination and gait  Psychiatric: no pressured speech; normal affect; no evidence of impaired cognition     Laboratory  Lab Results   Component Value Date    WBC 5.19 10/20/2020    HGB 14.0 10/20/2020    HCT 42.4 10/20/2020    MCV 90 10/20/2020     10/20/2020     BMP  Lab Results   Component Value Date     (L) 10/20/2020    K 4.0 10/20/2020     10/20/2020    CO2 22 (L) 10/20/2020    BUN 14 10/20/2020    CREATININE 0.8 10/20/2020    CALCIUM 8.9 10/20/2020    ANIONGAP 12 10/20/2020    ESTGFRAFRICA >60 10/20/2020    EGFRNONAA >60 10/20/2020     Lab Results   Component Value Date    ALT 13 10/20/2020    AST 21 10/20/2020    ALKPHOS 86 10/20/2020    BILITOT 1.4 (H) 10/20/2020     Lab Results   Component Value Date    INR 1.1 10/19/2020    INR 1.0 10/18/2020     Lab Results   Component Value Date    HGBA1C 5.6 10/18/2020       Diagnostic Results:    MRI Brain 10/19/20:  1 cm acute lacunar infarct in the white matter of the left frontal  lobe  Background of atrophy and prominent periventricular white matter changes  Probable developmental venous anomaly lateral left cerebellum subjacent encephalomalacia and signal changes suggesting remote hemorrhage.    Carotid Artery US 10/19/20:  No evidence of a hemodynamically significant carotid bifurcation stenosis.    2 D echo 10/19/20:  · The left ventricle is normal in size. The estimated ejection fraction is 65%.  · Grade I diastolic dysfunction.  · Mild mitral regurgitation.  · Mild to moderate tricuspid regurgitation.  · Mild aortic regurgitation.  · There is no evidence of intracardiac shunting.  · Normal central venous pressure (3 mmHg).  · The estimated PA systolic pressure is 50 mmHg.      TRANSITION OF CARE:     [facility] Contact Note: 10/21/20    Family and/or Caretaker present at visit?  Yes.  Diagnostic tests reviewed/disposition: I have reviewed all completed as well as pending diagnostic tests at the time of discharge.  Disease/illness education: Yes  Home health/community services discussion/referrals: Patient has home health established at [facility]..   Establishment or re-establishment of referral orders for community resources: No other necessary community resources.   Discussion with other health care providers: No discussion with other health care providers necessary.     ASSESSMENT & PLAN:       Cerebrovascular accident (CVA), unspecified mechanism  Thrombotic stroke involving left middle cerebral artery  - on Plavix, Aspirin, Statin   - continue plavix X 30 days, stop date 11/21/20, then continue aspirin monotherapy  - family deferred vascular neuro appt at this time and will seek appropriate follow up when they return to [place]   -     clopidogreL (PLAVIX) 75 mg tablet; Take 1 tablet (75 mg total) by mouth once daily.  Dispense: 30 tablet; Refill: 12    Essential hypertension  - not at goal, start Losartan   -     losartan (COZAAR) 50 MG tablet; Take 1 tablet (50 mg total)  by mouth once daily.  Dispense: 90 tablet; Refill: 3    [facility] will [facility] pending her return to PCP in [place] in 2-3 weeks.      Instructions for the patient:      Scheduled Follow-up :  No future appointments.    Post Visit Medication List:     Medication List          Accurate as of October 27, 2020  3:46 PM. If you have any questions, ask your nurse or doctor.            START taking these medications    losartan 50 MG tablet  Commonly known as: COZAAR  Take 1 tablet (50 mg total) by mouth once daily.  Started by: Audrey Mariee MD        CONTINUE taking these medications    albuterol 2.5 mg /3 mL (0.083 %) nebulizer solution  Commonly known as: PROVENTIL     aspirin 81 MG Chew     atorvastatin 80 MG tablet  Commonly known as: LIPITOR  Take 1 tablet (80 mg total) by mouth once daily.     clopidogreL 75 mg tablet  Commonly known as: PLAVIX  Take 1 tablet (75 mg total) by mouth once daily.     docusate sodium 100 MG capsule  Commonly known as: COLACE  Take 1 capsule (100 mg total) by mouth 2 (two) times daily.     omeprazole 20 MG capsule  Commonly known as: PRILOSEC     polyethylene glycol 17 gram Pwpk  Commonly known as: GLYCOLAX  Take 17 g by mouth once daily.           Where to Get Your Medications      These medications were sent to [facility]  [address], [place] [address]    Hours: Mon-Fri, 8a-5:30p Phone: [phone]   · losartan 50 MG tablet     Information about where to get these medications is not yet available    Ask your nurse or doctor about these medications  · clopidogreL 75 mg tablet         Signing Physician:  Audrey Mariee MD

## 2020-10-28 NOTE — DISCHARGE SUMMARY
[facility] Medicine  Discharge Summary      Patient Name: Jessica Parkinson  MRN: [id number]  Admission Date: 10/18/2020  Hospital Length of Stay: 2 days  Discharge Date and Time: 10/20/2020  2:55 PM  Attending Physician: Karen att. providers found   Discharging Provider: Debi Lee MD  Primary Care Provider: Primary Doctor Karen      HPI:   Ms. Parkinson is a 89 yo  female with pmh of hypertension who presented to ED with complaint of right-sided facial droop that began at approximately 2:30 p.m. today.  Last known normal at 2:30 p.m. History collected from granddaughter who reports right-sided facial droop with dysarthria and difficulty tolerating secretions. No additional symptoms reported including weakness, numbness, vision change or headache.  Per granddaughter, patient's facial droop has improved slightly on arrival to the emergency department. No complaints on my exam.     CTH shows moderate involutional and chronic small vessel changes not inappropriate for age. No evidence of acute hemorrhage, mass or infarction. Labs unremarkable.  VSS. Patient evaluated by vascular neurology with recommendation for CVA workup. Pt admitted to [facility].     * No surgery found *      Hospital Course:     She was placed in the hospital and had a stroke work up. Permissive hypertension  MRI- 1 cm acute lacunar infarct in the white matter of the left frontal lobe   Background of atrophy and prominent periventricular white matter changes   Probable developmental venous anomaly lateral left cerebellum subjacent encephalomalacia and signal changes suggesting remote hemorrhage.  ECHO-  · The left ventricle is normal in size. The estimated ejection fraction is 65%.  · Grade I diastolic dysfunction.  · Mild mitral regurgitation.  · Mild to moderate tricuspid regurgitation.  · Mild aortic regurgitation.  · There is no evidence of intracardiac shunting.  · Normal central venous pressure  (3 mmHg).  The estimated PA systolic pressure is 50 mmHg.  US Carotid negative  ASA, Statin   Vascular neurology was consulted.  She was seen by PT/OT. Was able to be safely discharged home with family with [facility].    On the day of discharge:  Patient's daughter and granddaughter are at the bedside  Her only complaints is constipation with no BM x 6-8 days  Denies nausea or abdominal pain  Moves everything with no focal deficits noted  Her family states that she is back to baseline.  They are going back to [place] but I have advised that they stay here for a week  Three  BP meds stopped.  Likely will not need any of these but will need close follow up      Consults:   Consults (From admission, onward)        Status Ordering Provider     Inpatient consult to Registered Dietitian/Nutritionist  Once     Provider:  (Not yet assigned)    Completed VANCE GUEVARA     Inpatient consult to Vascular (Stroke) Neurology  Once     Provider:  (Not yet assigned)    Completed VANCE GUEVARA.          * Thrombotic stroke involving left middle cerebral artery  89 y/o woman with HTN who presents with right facial droop, slurred speech and difficulty tolerating secretion. Onset 1500.    -CTH shows moderate involutional and chronic small vessel changes not inappropriate for age. No evidence of acute hemorrhage, mass or infarction.   - mild rt sided facial droop noted on exam, no additional focal deficits noted although appears somewhat confused (possibly difficulty with communication, although PAYAL  used)  -MRI brain with deep left MCA stroke  -carotid US pending  -TTE w/o PFO, normal EF  -initiated on ASA 81, plavix 300->75mg daily; will give DAPT x21 days then ASA alone  -initiated on atorvastatin 80  -Vascular neurology following  -A1C 5.6  -, HDL 15   -SLP eval and treat   -PT/OT eval and treat     Slow transit constipation        Debilitated  - will discharge with       Hyperlipemia  - initiate  atorvastatin 80      Essential hypertension  Sent home on no meds      Stroke  - see above        Final Active Diagnoses:    Diagnosis Date Noted POA    PRINCIPAL PROBLEM:  Thrombotic stroke involving left middle cerebral artery [I63.312] 10/19/2020 Yes    Slow transit constipation [K59.01] 10/20/2020 Yes    Essential hypertension [I10] 10/19/2020 Yes    Hyperlipemia [E78.5] 10/19/2020 Yes    Debilitated [R53.81] 10/19/2020 Yes    Stroke [I63.9] 10/18/2020 Yes      Problems Resolved During this Admission:    Diagnosis Date Noted Date Resolved POA    Cytotoxic cerebral edema [G93.6] 10/19/2020 10/27/2020 Yes    Facial droop [R29.810] 10/18/2020 10/27/2020 Yes       Discharged Condition: good    Disposition: Home or Self Care    Follow Up:  Follow-up Information     Primary Doctor No In 1 week.           Audrey Mariee MD In 3 days.    Specialty: Hospitalist  Contact information:  [address]  [address]  [facility] [id number] [phone]                 Patient Instructions:      Diet Dysphagia Mechanical Soft     Notify your health care provider if you experience any of the following:  persistent dizziness, light-headedness, or visual disturbances     Activity as tolerated       Significant Diagnostic Studies: see chart    Pending Diagnostic Studies:     None         Medications:  Reconciled Home Medications:      Medication List      START taking these medications    atorvastatin 80 MG tablet  Commonly known as: LIPITOR  Take 1 tablet (80 mg total) by mouth once daily.     docusate sodium 100 MG capsule  Commonly known as: COLACE  Take 1 capsule (100 mg total) by mouth 2 (two) times daily.     polyethylene glycol 17 gram Pwpk  Commonly known as: GLYCOLAX  Take 17 g by mouth once daily.        CONTINUE taking these medications    albuterol 2.5 mg /3 mL (0.083 %) nebulizer solution  Commonly known as: PROVENTIL  Take 2.5 mg by nebulization every 6 (six) hours as needed for Wheezing. Rescue     aspirin  81 MG Chew  Take 81 mg by mouth once daily.     omeprazole 20 MG capsule  Commonly known as: PRILOSEC  Take 20 mg by mouth once daily.        STOP taking these medications    amLODIPine 10 MG tablet  Commonly known as: NORVASC     losartan 25 MG tablet  Commonly known as: COZAAR     metoprolol succinate 100 MG 24 hr tablet  Commonly known as: TOPROL-XL            Indwelling Lines/Drains at time of discharge:   Lines/Drains/Airways     None                 Time spent on the discharge of patient: 35 minutes  Patient was seen and examined on the date of discharge and determined to be suitable for discharge.         Debi Lee MD  Department of Hospital Medicine  [facility]

## 2020-11-03 ENCOUNTER — DOCUMENT SCAN (OUTPATIENT)
Dept: HOME HEALTH SERVICES | Facility: HOSPITAL | Age: 85
End: 2020-11-03
Payer: MEDICAID

## 2020-11-12 ENCOUNTER — DOCUMENT SCAN (OUTPATIENT)
Dept: HOME HEALTH SERVICES | Facility: HOSPITAL | Age: 85
End: 2020-11-12
Payer: MEDICAID

## 2020-12-28 ENCOUNTER — EXTERNAL HOME HEALTH (OUTPATIENT)
Dept: HOME HEALTH SERVICES | Facility: HOSPITAL | Age: 85
End: 2020-12-28
Payer: MEDICAID